# Patient Record
Sex: FEMALE | Race: WHITE | NOT HISPANIC OR LATINO | Employment: OTHER | URBAN - METROPOLITAN AREA
[De-identification: names, ages, dates, MRNs, and addresses within clinical notes are randomized per-mention and may not be internally consistent; named-entity substitution may affect disease eponyms.]

---

## 2017-02-14 ENCOUNTER — TRANSFERRED RECORDS (OUTPATIENT)
Dept: FAMILY MEDICINE | Facility: CLINIC | Age: 67
End: 2017-02-14

## 2017-02-14 LAB
CHOLEST SERPL-MCNC: 164 MG/DL (ref 0–200)
CREAT SERPL-MCNC: 1 MG/DL (ref 0.6–1.1)
GFR SERPL CREATININE-BSD FRML MDRD: >60 ML/MIN/1.73M2
HDLC SERPL-MCNC: 75 MG/DL
LDLC SERPL CALC-MCNC: 76 MG/DL
NONHDLC SERPL-MCNC: 89 MG/DL
TRIGL SERPL-MCNC: 64 MG/DL
TSH SERPL-ACNC: 2.7 (ref 0.3–4.2)

## 2017-05-01 DIAGNOSIS — Z76.0 ENCOUNTER FOR MEDICATION REFILL: ICD-10-CM

## 2017-05-01 RX ORDER — SIMVASTATIN 20 MG
TABLET ORAL
Qty: 90 TABLET | Refills: 0 | Status: SHIPPED | OUTPATIENT
Start: 2017-05-01 | End: 2017-07-29

## 2017-05-04 ENCOUNTER — OFFICE VISIT (OUTPATIENT)
Dept: FAMILY MEDICINE | Facility: CLINIC | Age: 67
End: 2017-05-04

## 2017-05-04 VITALS
OXYGEN SATURATION: 99 % | HEART RATE: 62 BPM | SYSTOLIC BLOOD PRESSURE: 102 MMHG | WEIGHT: 133 LBS | DIASTOLIC BLOOD PRESSURE: 70 MMHG | BODY MASS INDEX: 21.63 KG/M2

## 2017-05-04 DIAGNOSIS — R30.0 DYSURIA: Primary | ICD-10-CM

## 2017-05-04 DIAGNOSIS — N30.00 ACUTE CYSTITIS WITHOUT HEMATURIA: ICD-10-CM

## 2017-05-04 LAB
BACTERIA URINE: ABNORMAL
BILIRUB UR QL STRIP: 0
BLOOD URINE DIP: ABNORMAL
CASTS/LPF: 0
COLOR UR: YELLOW
CRYSTAL URINE: 0
EPITHELIAL CELLS - QUEST: ABNORMAL
GLUCOSE UR STRIP-MCNC: 0 MG/DL
KETONES UR QL STRIP: 0
LEUKOCYTE ESTERASE URINE DIP: ABNORMAL
MUCOUS URINE: 0
NITRITE UR QL STRIP: ABNORMAL
PH UR STRIP: 6 PH (ref 5–9)
PROT UR QL: 0 MG/DL (ref ?–0.01)
RBC URINE: ABNORMAL (ref 0–3)
SP GR UR STRIP: 1 (ref 1–1.02)
UROBILINOGEN UR QL STRIP: 0.2 EU/DL (ref 0.2–1)
WBC URINE: ABNORMAL (ref 0–3)

## 2017-05-04 PROCEDURE — 81003 URINALYSIS AUTO W/O SCOPE: CPT | Performed by: FAMILY MEDICINE

## 2017-05-04 PROCEDURE — 99213 OFFICE O/P EST LOW 20 MIN: CPT | Performed by: FAMILY MEDICINE

## 2017-05-04 RX ORDER — PHENAZOPYRIDINE HYDROCHLORIDE 100 MG/1
100 TABLET, FILM COATED ORAL 3 TIMES DAILY PRN
Qty: 12 TABLET | Refills: 0 | Status: SHIPPED | OUTPATIENT
Start: 2017-05-04 | End: 2017-12-05

## 2017-05-04 RX ORDER — CIPROFLOXACIN 250 MG/1
250 TABLET, FILM COATED ORAL 2 TIMES DAILY
Qty: 14 TABLET | Refills: 0 | Status: SHIPPED | OUTPATIENT
Start: 2017-05-04 | End: 2017-12-05

## 2017-05-04 NOTE — MR AVS SNAPSHOT
"              After Visit Summary   5/4/2017    Madeleine Morales    MRN: [id number]           Patient Information     Date Of Birth          1950        Visit Information        Provider Department      5/4/2017 2:30 PM Estefanía Cantu MD [facility]        Today's Diagnoses     Dysuria    -  1    Acute cystitis without hematuria           Follow-ups after your visit        Your next 10 appointments already scheduled     Jun 07, 2017  9:00 AM CDT   DX HIP/PELVIS/SPINE with SHMADX1   [facility]    [address] [place] [id number] [phone]           Please do not take any of the following 48 hours prior to your exam: vitamins, calcium tablets, antacids.              Future tests that were ordered for you today     Open Future Orders        Priority Expected Expires Ordered    DX Hip/Pelvis/Spine Routine  5/3/2018 5/3/2017            Who to contact     If you have questions or need follow up information about today's clinic visit or your schedule please contact [facility] directly at [phone].  Normal or non-critical lab and imaging results will be communicated to you by Aspectivahart, letter or phone within 4 business days after the clinic has received the results. If you do not hear from us within 7 days, please contact the clinic through BayRut or phone. If you have a critical or abnormal lab result, we will notify you by phone as soon as possible.  Submit refill requests through 8bit or call your pharmacy and they will forward the refill request to us. Please allow 3 business days for your refill to be completed.          Additional Information About Your Visit        MyChart Information     8bit lets you send messages to your doctor, view your test results, renew your prescriptions, schedule appointments and more. To sign up, go to [email or web address] . Click on \"Log in\" on the left side of the " "screen, which will take you to the Welcome page. Then click on \"Sign up Now\" on the right side of the page.     You will be asked to enter the access code listed below, as well as some personal information. Please follow the directions to create your username and password.     Your access code is: [email or web address]  Expires: 2017  3:52 PM     Your access code will  in 90 days. If you need help or a new code, please call your [facility] or [phone].        Care EveryWhere ID     This is your Care EveryWhere ID. This could be used by other organizations to access your [facility] medical records  [id number]        Your Vitals Were     Pulse Pulse Oximetry BMI (Body Mass Index)             62 99% 21.63 kg/m2          Blood Pressure from Last 3 Encounters:   17 102/70   10/27/16 102/58   07/23/15 108/66    Weight from Last 3 Encounters:   17 60.3 kg (133 lb)   10/27/16 59.9 kg (132 lb)   07/23/15 57.8 kg (127 lb 6.4 oz)              We Performed the Following     Urinalysis w/reflex protein, bili ([facility])     Urine Culture  Routine ([facility])          Today's Medication Changes          These changes are accurate as of: 17  3:52 PM.  If you have any questions, ask your nurse or doctor.               Start taking these medicines.        Dose/Directions    ciprofloxacin 250 MG tablet   Commonly known as:  CIPRO   Used for:  Acute cystitis without hematuria   Started by:  Estefanía Cantu MD        Dose:  250 mg   Take 1 tablet (250 mg) by mouth 2 times daily   Quantity:  14 tablet   Refills:  0       phenazopyridine 100 MG tablet   Commonly known as:  PYRIDIUM   Used for:  Acute cystitis without hematuria   Started by:  Estefanía Cantu MD        Dose:  100 mg   Take 1 tablet (100 mg) by mouth 3 times daily as needed for urinary tract discomfort   Quantity:  12 tablet   Refills:  0            Where to get your medicines      These medications were sent to [facility] - " [facility]AN[facility] - [address] AT [address] [place] [address]  [address] [address]    Hours:  24-hours Phone:  [phone]     ciprofloxacin 250 MG tablet    phenazopyridine 100 MG tablet                Primary Care Provider Office Phone # Fax #    Kb Lua [phone]       [facility] [address]  [facility] [address]        Thank you!     Thank you for choosing [facility]  for your care. Our goal is always to provide you with excellent care. Hearing back from our patients is one way we can continue to improve our services. Please take a few minutes to complete the written survey that you may receive in the mail after your visit with us. Thank you!             Your Updated Medication List - Protect others around you: Learn how to safely use, store and throw away your medicines at www.disposemymeds.org.          This list is accurate as of: 5/4/17  3:52 PM.  Always use your most recent med list.                   Brand Name Dispense Instructions for use    CALCIUM 600+D PO      Take by mouth 2 times daily       ciprofloxacin 250 MG tablet    CIPRO    14 tablet    Take 1 tablet (250 mg) by mouth 2 times daily       clobetasol 0.05 % cream    TEMOVATE         clotrimazole-betamethasone cream    LOTRISONE     DEVIN EXT TO THE AFFECTED AND SURROUNDING AREAS OF SKIN BID IN THE MORNING AND IN THE KASSY PRN       estradiol 0.1 MG/GM cream    ESTRACE     Place 2 g vaginally three times a week       FAMVIR 250 MG Tabs tablet   Generic drug:  famciclovir      Take 250 mg by mouth       FIBERCON 625 MG tablet   Generic drug:  calcium polycarbophil      Take 1 tablet by mouth 2 times daily       MULTI FOR HER 50+ PO      Take by mouth daily       phenazopyridine 100 MG tablet    PYRIDIUM    12 tablet    Take 1 tablet (100 mg) by mouth 3 times daily as needed for urinary tract discomfort       PROBIOTIC DAILY PO      Take by mouth daily 7 days each month        SB LOW DOSE ASA EC 81 MG EC tablet   Generic drug:  aspirin      Take 81 mg by mouth daily       simvastatin 20 MG tablet    ZOCOR    90 tablet    TAKE 1 TABLET BY MOUTH AT BEDTIME       valACYclovir 1000 mg tablet    VALTREX

## 2017-05-04 NOTE — LETTER
[facility] [address] [place]  [address]  Phone: [phone]    May 8, 2017      Madeleine Morales  [address] [facility]  [address] [place] [id number]              Dear Madeleine,    Culture grew bacteria that is susceptible to Cipro, please finish the full course of your antibiotic.         Sincerely,     Kb Lua M.D.    Results for orders placed or performed in visit on 05/04/17   Urinalysis w/reflex protein, bili ([facility])   Result Value Ref Range    Color Urine Yellow     pH Urine 6.0 5 - 9 pH    Specific Gravity Urine 1.005 1.005 - 1.025    Protein Urine 0 0.01 mg/dL    Glucose Urine 0     Ketones Urine 0     Leukocyte Esterase Urine 1+ (A)     Blood Urine 1+ (A)     Nitrite Urine Pos (A) NEG    Bilirubin Urine Dip 0     Urobilinogen Urine 0.2 0.2 - 1.0 EU/dL    WBC Urine 3-6 0 - 3    RBC Urine 0-3 0 - 3    Epithelial Cells Few     Crystal Urine 0     Bacteria Urine Mod     Mucous Urine 0     Casts/LPF 0    Urine Culture  Routine ([facility])   Result Value Ref Range    Urine Culture Final report (A)     Result 1 Escherichia coli (A)     Antimicrobial Susceptibility Comment     Narrative    Performed at:  01 - [facility]  [address] [place] [address]  [id number]  : Anjel Farris MD, Phone:  [phone]

## 2017-05-04 NOTE — PROGRESS NOTES
Problem(s) Oriented visit        SUBJECTIVE:                                                    Madeleine Morales is a 67 year old female who presents to clinic today for painful urination, urgency, and bladder spasm sensation that came on about 36 hours ago. She took some AZO which helped. No fever or back pain.       Problem list, Medication list, Allergies, and Medical/Social/Surgical histories reviewed in [facility] and updated as appropriate.   Additional history: as documented    ROS:  5 point ROS completed and negative except noted above, including Gen, CV, Resp, GI, MS      Histories:   Patient Active Problem List   Diagnosis     Renal artery stenosis (H)     Bronchiectasis (H)     Pure hypercholesterolemia     HSV (herpes simplex virus) infection     Health Care Home     Past Surgical History:   Procedure Laterality Date     toe cystectomy  2012    right great toe        Social History   Substance Use Topics     Smoking status: Never Smoker     Smokeless tobacco: Never Used     Alcohol use 0.6 oz/week     1 Standard drinks or equivalent per week     No family history on file.        OBJECTIVE:                                                    /70  Pulse 62  Wt 60.3 kg (133 lb)  SpO2 99%  BMI 21.63 kg/m2  Body mass index is 21.63 kg/(m^2).   GENERAL APPEARANCE: Alert, no acute distress  MS: extremities normal, no peripheral edema  MS: no CVA tenderness  NEURO: Alert, oriented, speech and mentation normal  PSYCH: mentation appears normal, affect and mood normal   Labs Resulted Today:   Results for orders placed or performed in visit on 05/04/17   Urinalysis w/reflex protein, bili ([facility])   Result Value Ref Range    Color Urine Yellow     pH Urine 6.0 5 - 9 pH    Specific Gravity Urine 1.005 1.005 - 1.025    Protein Urine 0 0.01 mg/dL    Glucose Urine 0     Ketones Urine 0     Leukocyte Esterase Urine 1+ (A)     Blood Urine 1+ (A)     Nitrite Urine Pos (A) NEG    Bilirubin Urine Dip 0     Urobilinogen Urine  0.2 0.2 - 1.0 EU/dL    WBC Urine 3-6 0 - 3    RBC Urine 0-3 0 - 3    Epithelial Cells Few     Crystal Urine 0     Bacteria Urine Mod     Mucous Urine 0     Casts/LPF 0      ASSESSMENT/PLAN:                                                        Madeleine was seen today for uti.    Diagnoses and all orders for this visit:    Dysuria  -     Urinalysis w/reflex protein, bili ([facility])  -     Urine Culture  Routine ([facility])    Acute cystitis without hematuria  -     ciprofloxacin (CIPRO) 250 MG tablet; Take 1 tablet (250 mg) by mouth 2 times daily  -     phenazopyridine (PYRIDIUM) 100 MG tablet; Take 1 tablet (100 mg) by mouth 3 times daily as needed for urinary tract discomfort            The following health maintenance items are reviewed in Epic and correct as of today:  Health Maintenance   Topic Date Due     ADVANCE DIRECTIVE PLANNING Q5 YRS (NO INBASKET)  04/08/1966     HEPATITIS C SCREENING  04/08/1966     INFLUENZA VACCINE (SYSTEM ASSIGNED)  09/01/2017     FALL RISK ASSESSMENT  05/04/2018     MAMMO SCREEN Q2 YR (SYSTEM ASSIGNED)  06/27/2018     LIPID SCREEN Q5 YR FEMALE (SYSTEM ASSIGNED)  09/02/2019     TETANUS IMMUNIZATION (SYSTEM ASSIGNED)  01/01/2022     COLONOSCOPY Q10 YR INBASKET MESSAGE  12/29/2024     DEXA SCAN SCREENING (SYSTEM ASSIGNED)  Completed     PNEUMOCOCCAL  Completed       Estefanía Cantu MD  [facility]  Family Practice  [facility]  [phone]    For any issues my office # is [phone]

## 2017-05-07 LAB
ANTIMICROBIAL SUSCEPTIBILITY: ABNORMAL
Lab: ABNORMAL
URINE CULTURE: ABNORMAL

## 2017-05-08 ENCOUNTER — TELEPHONE (OUTPATIENT)
Dept: FAMILY MEDICINE | Facility: CLINIC | Age: 67
End: 2017-05-08

## 2017-05-08 DIAGNOSIS — N39.0 URINARY TRACT INFECTION: Primary | ICD-10-CM

## 2017-05-08 RX ORDER — CEFUROXIME AXETIL 500 MG/1
500 TABLET ORAL 2 TIMES DAILY
Qty: 14 TABLET | Refills: 0 | Status: SHIPPED | OUTPATIENT
Start: 2017-05-08 | End: 2017-05-15

## 2017-05-08 NOTE — TELEPHONE ENCOUNTER
Patient called requesting urine culture results and reporting symptoms are better but not resolved. Still having pressure/discomfort at beginning of void and frequency, although frequency is only mild. Feels like not emptying fully. Denies fever/ill/chill/n&v/flank or abd pain/hematuria.   Has been taking cipro 250 BID since 5/4 visit with Dr. Cantu and pushing H2O.   Urine culture shows >100,000 e.coli and sensitive to all meds on report, including cipro.   Allergies: sulfa=rash; cefdinir=gi upset; omnicef =?   Plan: per Dr. Cantu change to cefuroxime 500mg BID x 7 days if confirm omnicef reaction only GI upset or other relatively minor side effect/intolerance.  Left message on patient's voicemail with plan and to call if hx of rash or more significant reaction/side effect with omnicef.  Maggy Washington RN

## 2017-06-07 ENCOUNTER — HOSPITAL ENCOUNTER (OUTPATIENT)
Dept: BONE DENSITY | Facility: CLINIC | Age: 67
Discharge: HOME | End: 2017-06-07
Attending: INTERNAL MEDICINE | Admitting: INTERNAL MEDICINE
Payer: COMMERCIAL

## 2017-06-07 DIAGNOSIS — M81.0 OSTEOPOROSIS: ICD-10-CM

## 2017-06-07 PROCEDURE — 77080 DXA BONE DENSITY AXIAL: CPT

## 2017-06-17 NOTE — PROGRESS NOTES
Dear Madeleine,   I am writing to report that your included test results are within expected ranges. I do not suggest that we make any changes at this time.    Kb Lua M.D.

## 2017-06-28 ENCOUNTER — HOSPITAL ENCOUNTER (OUTPATIENT)
Dept: MAMMOGRAPHY | Facility: CLINIC | Age: 67
Discharge: HOME | End: 2017-06-28
Attending: OBSTETRICS & GYNECOLOGY | Admitting: OBSTETRICS & GYNECOLOGY
Payer: COMMERCIAL

## 2017-06-28 DIAGNOSIS — Z12.31 VISIT FOR SCREENING MAMMOGRAM: ICD-10-CM

## 2017-06-28 PROCEDURE — 77063 BREAST TOMOSYNTHESIS BI: CPT

## 2017-07-29 DIAGNOSIS — Z76.0 ENCOUNTER FOR MEDICATION REFILL: ICD-10-CM

## 2017-07-30 RX ORDER — SIMVASTATIN 20 MG
TABLET ORAL
Qty: 90 TABLET | Refills: 0 | Status: SHIPPED | OUTPATIENT
Start: 2017-07-30 | End: 2017-12-12

## 2017-09-28 DIAGNOSIS — Z23 NEED FOR PROPHYLACTIC VACCINATION AND INOCULATION AGAINST INFLUENZA: Primary | ICD-10-CM

## 2017-09-28 PROCEDURE — 90662 IIV NO PRSV INCREASED AG IM: CPT | Performed by: FAMILY MEDICINE

## 2017-09-28 PROCEDURE — G0008 ADMIN INFLUENZA VIRUS VAC: HCPCS | Performed by: FAMILY MEDICINE

## 2017-09-28 NOTE — PROGRESS NOTES
Injectable Influenza Immunization Documentation    1.  Is the person to be vaccinated sick today?   No    2. Does the person to be vaccinated have an allergy to a component   of the vaccine?   No    3. Has the person to be vaccinated ever had a serious reaction   to influenza vaccine in the past?   No    4. Has the person to be vaccinated ever had Guillain-Barré syndrome?   No    Form completed by Lopez Beckham

## 2017-12-05 ENCOUNTER — OFFICE VISIT (OUTPATIENT)
Dept: FAMILY MEDICINE | Facility: CLINIC | Age: 67
End: 2017-12-05

## 2017-12-05 VITALS
WEIGHT: 133 LBS | RESPIRATION RATE: 16 BRPM | HEART RATE: 74 BPM | TEMPERATURE: 97.7 F | BODY MASS INDEX: 21.63 KG/M2 | DIASTOLIC BLOOD PRESSURE: 72 MMHG | OXYGEN SATURATION: 96 % | SYSTOLIC BLOOD PRESSURE: 122 MMHG

## 2017-12-05 DIAGNOSIS — R07.89 LEFT-SIDED CHEST WALL PAIN: Primary | ICD-10-CM

## 2017-12-05 DIAGNOSIS — Z11.59 NEED FOR HEPATITIS C SCREENING TEST: ICD-10-CM

## 2017-12-05 PROCEDURE — 99214 OFFICE O/P EST MOD 30 MIN: CPT | Performed by: FAMILY MEDICINE

## 2017-12-05 PROCEDURE — 71020 XR CHEST 2 VW: CPT | Performed by: FAMILY MEDICINE

## 2017-12-05 RX ORDER — METHYLPREDNISOLONE 4 MG
TABLET, DOSE PACK ORAL
Qty: 21 TABLET | Refills: 0 | Status: SHIPPED | OUTPATIENT
Start: 2017-12-05 | End: 2017-12-19

## 2017-12-05 NOTE — MR AVS SNAPSHOT
"              After Visit Summary   12/5/2017    Madeleine Morales    MRN: [id number]           Patient Information     Date Of Birth          1950        Visit Information        Provider Department      12/5/2017 1:15 PM Sherley Gerardo MD [facility]        Today's Diagnoses     Left-sided chest wall pain    -  1    Need for hepatitis C screening test          Care Instructions    CXR was normal and Sent to [facility] for overreading     Prednisone burst for left sided chest wall pain    Hep C screen today          Follow-ups after your visit        Who to contact     If you have questions or need follow up information about today's clinic visit or your schedule please contact [facility] directly at [phone].  Normal or non-critical lab and imaging results will be communicated to you by Pushkarthart, letter or phone within 4 business days after the clinic has received the results. If you do not hear from us within 7 days, please contact the clinic through Pushkarthart or phone. If you have a critical or abnormal lab result, we will notify you by phone as soon as possible.  Submit refill requests through FanTree or call your pharmacy and they will forward the refill request to us. Please allow 3 business days for your refill to be completed.          Additional Information About Your Visit        MyChart Information     FanTree lets you send messages to your doctor, view your test results, renew your prescriptions, schedule appointments and more. To sign up, go to [email or web address] . Click on \"Log in\" on the left side of the screen, which will take you to the Welcome page. Then click on \"Sign up Now\" on the right side of the page.     You will be asked to enter the access code listed below, as well as some personal information. Please follow the directions to create your username and password.     Your access code is: [email or web address]  Expires: 3/5/2018  1:49 PM     Your access code " will  in 90 days. If you need help or a new code, please call your [facility] clinic or [phone].        Care EveryWhere ID     This is your Care EveryWhere ID. This could be used by other organizations to access your [facility] medical records  [id number]        Your Vitals Were     Pulse Temperature Respirations Pulse Oximetry BMI (Body Mass Index)       74 97.7  F (36.5  C) (Oral) 16 96% 21.63 kg/m2        Blood Pressure from Last 3 Encounters:   17 122/72   17 102/70   10/27/16 102/58    Weight from Last 3 Encounters:   17 60.3 kg (133 lb)   17 60.3 kg (133 lb)   10/27/16 59.9 kg (132 lb)              We Performed the Following     HCV Antibody ([facility])     X-ray Chest 2 vws*          Today's Medication Changes          These changes are accurate as of: 17  1:49 PM.  If you have any questions, ask your nurse or doctor.               Start taking these medicines.        Dose/Directions    methylPREDNISolone 4 MG tablet   Commonly known as:  MEDROL DOSEPAK   Used for:  Left-sided chest wall pain   Started by:  Sherley Gerardo MD        Follow package instructions   Quantity:  21 tablet   Refills:  0            Where to get your medicines      These medications were sent to [facility] [address] [place] [address] [address] [place] [id number]    Hours:  24-hours Phone:  [phone]     methylPREDNISolone 4 MG tablet                Primary Care Provider Office Phone # Fax #    bK Lua [phone] [address] [facility] [address]        Equal Access to Services     [facility] : Swetha Foster, lalito benedict, ariella kaalmayasmin umanzor, jonathan ennis. So [facility] [phone].    ATENCIÓN: Si habla español, tiene a gill disposición servicios gratuitos de asistencia lingüística. Llame al [phone].    We comply with applicable federal  civil rights laws and [place] laws. We do not discriminate on the basis of race, color, national origin, age, disability, sex, sexual orientation, or gender identity.            Thank you!     Thank you for choosing [facility]  for your care. Our goal is always to provide you with excellent care. Hearing back from our patients is one way we can continue to improve our services. Please take a few minutes to complete the written survey that you may receive in the mail after your visit with us. Thank you!             Your Updated Medication List - Protect others around you: Learn how to safely use, store and throw away your medicines at www.disposemymeds.org.          This list is accurate as of: 12/5/17  1:49 PM.  Always use your most recent med list.                   Brand Name Dispense Instructions for use Diagnosis    CALCIUM 600+D PO      Take by mouth 2 times daily    Issue of repeat prescriptions       clobetasol 0.05 % cream    TEMOVATE          clotrimazole-betamethasone cream    LOTRISONE     DEVIN EXT TO THE AFFECTED AND SURROUNDING AREAS OF SKIN BID IN THE MORNING AND IN THE KASSY PRN        CRANBERRY (VACC OXYCOCCUS) PO           estradiol 0.1 MG/GM cream    ESTRACE     Place 2 g vaginally three times a week        FAMVIR 250 MG Tabs tablet   Generic drug:  famciclovir      Take 250 mg by mouth        FIBERCON 625 MG tablet   Generic drug:  calcium polycarbophil      Take 1 tablet by mouth 2 times daily        methylPREDNISolone 4 MG tablet    MEDROL DOSEPAK    21 tablet    Follow package instructions    Left-sided chest wall pain       MULTI FOR HER 50+ PO      Take by mouth daily    Issue of repeat prescriptions       PROBIOTIC DAILY PO      Take by mouth daily 7 days each month    Issue of repeat prescriptions       SB LOW DOSE ASA EC 81 MG EC tablet   Generic drug:  aspirin      Take 81 mg by mouth daily    Issue of repeat prescriptions       simvastatin 20 MG tablet    ZOCOR    90 tablet     TAKE 1 TABLET BY MOUTH AT BEDTIME    Encounter for medication refill       valACYclovir 1000 mg tablet    VALTREX

## 2017-12-05 NOTE — PATIENT INSTRUCTIONS
CXR was normal and Sent to [facility] imaging for overreading     Prednisone burst for left sided chest wall pain    Hep C screen today

## 2017-12-05 NOTE — LETTER
[facility] [address] [place]  [address]  Phone: [phone]    December 7, 2017      Madeleine Morales  [address] [address]              Dear Madeleine,    The results from your recent visit showed Hepatitis C was negative.  You may continue your current plan of care.      Sincerely,     Sherley Gerardo M.D.    Results for orders placed or performed in visit on 12/05/17   HCV Antibody ([facility])   Result Value Ref Range    Hep C Virus Ab <0.1 0.0 - 0.9 s/co ratio    Narrative    Performed at:  01 - [facility] [address], [place], [place]  [id number]  : Anjel Farris MD, Phone:  [phone]

## 2017-12-05 NOTE — PROGRESS NOTES
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    Right tricep (week before thanksgiving) bleed  During pickle ball      Musculoskeletal problem/pain      Duration: 3 days, no recent injurys     Description  Location: left upper pectoral and left axillary pain     Intensity: currently  9/10    Accompanying signs and symptoms: describes pain as sharp intermittent pain, does not radiate, no numbness, no tingling (zingers)    History  Previous similar problem: no   Previous evaluation:  none    Precipitating or alleviating factors:  Trauma or overuse: no   Aggravating factors include: NONE     Therapies tried and outcome: Tylenol 500 mg two tablets - somewhat effective, ibuprofen 200 mg two tablets - not effective      Ibuprofen/Tylenol helped some    Did not take anything today    Sleep interrupted sleep yesterday  Appetite ok  Exercise walking    Smoking no  ETOH rare wine  Street drugs/MJ no  Caffeine instant coffee    Travel [place]  Exposures no                  Problem list and histories reviewed & adjusted, as indicated.  Additional history: as documented    Patient Active Problem List   Diagnosis     Renal artery stenosis (H)     Bronchiectasis (H)     Pure hypercholesterolemia     HSV (herpes simplex virus) infection     Health Care Home     Past Surgical History:   Procedure Laterality Date     toe cystectomy  2012    right great toe        Social History   Substance Use Topics     Smoking status: Never Smoker     Smokeless tobacco: Never Used     Alcohol use 0.6 oz/week     1 Standard drinks or equivalent per week     No family history on file.      Current Outpatient Prescriptions   Medication Sig Dispense Refill     simvastatin (ZOCOR) 20 MG tablet TAKE 1 TABLET BY MOUTH AT BEDTIME 90 tablet 0     ciprofloxacin (CIPRO) 250 MG tablet Take 1  "tablet (250 mg) by mouth 2 times daily 14 tablet 0     phenazopyridine (PYRIDIUM) 100 MG tablet Take 1 tablet (100 mg) by mouth 3 times daily as needed for urinary tract discomfort 12 tablet 0     famciclovir (FAMVIR) 250 MG TABS Take 250 mg by mouth       valACYclovir (VALTREX) 1000 mg tablet   2     clobetasol (TEMOVATE) 0.05 % cream   0     clotrimazole-betamethasone (LOTRISONE) cream DEVIN EXT TO THE AFFECTED AND SURROUNDING AREAS OF SKIN BID IN THE MORNING AND IN THE KASSY PRN  2     calcium polycarbophil (FIBERCON) 625 MG tablet Take 1 tablet by mouth 2 times daily       estradiol (ESTRACE) 0.1 MG/GM vaginal cream Place 2 g vaginally three times a week       Multiple Vitamins-Minerals (MULTI FOR HER 50+ PO) Take by mouth daily       Calcium Carbonate-Vitamin D (CALCIUM 600+D PO) Take by mouth 2 times daily        aspirin (SB LOW DOSE ASA EC) 81 MG EC tablet Take 81 mg by mouth daily       Probiotic Product (PROBIOTIC DAILY PO) Take by mouth daily 7 days each month       Allergies   Allergen Reactions     Cefdinir      Other reaction(s): GI Upset     Omnicef      Sulfa Drugs Rash     Recent Labs   Lab Test  02/14/17   0829 09/02/14 07/02/13   LDL  76  95   --   76   HDL  75  74   --   52   TRIG  64  72   --   97   CR  1.0  0.9   < >   --    GFRESTIMATED  >60   --    --    --    GFRESTBLACK  >60   --    --    --    TSH  2.7   --    --    --     < > = values in this interval not displayed.      BP Readings from Last 3 Encounters:   12/08/17 124/76   12/05/17 122/72   05/04/17 102/70    Wt Readings from Last 3 Encounters:   12/08/17 62.1 kg (137 lb)   12/05/17 60.3 kg (133 lb)   05/04/17 60.3 kg (133 lb)       Estimated body mass index is 21.63 kg/(m^2) as calculated from the following:    Height as of 7/23/15: 1.67 m (5' 5.75\").    Weight as of this encounter: 60.3 kg (133 lb).             Labs reviewed in EPIC          Reviewed and updated as needed this visit by clinical staff     Reviewed and updated as needed " this visit by Provider         ROS:  Constitutional, HEENT, cardiovascular, pulmonary, GI, , musculoskeletal, neuro, skin, endocrine and psych systems are negative, except as otherwise noted.      OBJECTIVE:   /72  Pulse 74  Temp 97.7  F (36.5  C) (Oral)  Resp 16  Wt 60.3 kg (133 lb)  SpO2 96%  BMI 21.63 kg/m2  There is no height or weight on file to calculate BMI.  GENERAL: healthy, alert and no distress  EYES: Eyes grossly normal to inspection, PERRL and conjunctivae and sclerae normal  HENT: ear canals and TM's normal, nose and mouth without ulcers or lesions  NECK: no adenopathy, no asymmetry, masses, or scars and thyroid normal to palpation  RESP: lungs clear to auscultation - no rales, rhonchi or wheezes  CV: regular rate and rhythm, normal S1 S2, no S3 or S4, no murmur, click or rub, no peripheral edema and peripheral pulses strong  ABDOMEN: soft, nontender, no hepatosplenomegaly, no masses and bowel sounds normal  MS: no gross musculoskeletal defects noted, no edema  SKIN: no suspicious lesions or rashes  NEURO: Normal strength and tone, mentation intact and speech normal  PSYCH: mentation appears normal, affect normal/bright  LYMPH: no cervical, supraclavicular, axillary, or inguinal adenopathy    Diagnostic Test Results:  Results for orders placed or performed during the hospital encounter of 06/28/17   MA Screen Bilateral w/Nnamdi    Narrative    SCREENING MAMMOGRAM, BILATERAL, DIGITAL w/CAD AND TOMOSYNTHESIS -  6/28/2017 9:23 AM    BREAST SYMPTOMS: No current breast complaints.     COMPARISON:  6/27/2016,  6/23/2015, 6/9/2014, 6/6/2013.    BREAST DENSITY: Scattered fibroglandular densities.    COMMENTS: No findings of suspicion for malignancy.       Impression    IMPRESSION: BI-RADS CATEGORY: 1 -  Negative    RECOMMENDED FOLLOW-UP: Annual Mammography.    Exam results letter mailed to patient.      NELLA PRIEST MD     LDL Cholesterol Calculated   Date Value Ref Range Status   02/14/2017 76  <130 mg/dL Final   ]  Was at [facility]. Stable bronchiectasis.    Living will has one.  Hep C screen today    ASSESSMENT/PLAN:     ASSESSMENT / PLAN:  (R07.89) Left-sided chest wall pain  (primary encounter diagnosis)  Comment:   Plan: X-ray Chest 2 vws*, methylPREDNISolone (MEDROL         DOSEPAK) 4 MG tablet, RUI PT, HAND, AND         CHIROPRACTIC REFERRAL            (Z11.59) Need for hepatitis C screening test  Comment:   Plan: HCV Antibody ([facility])                Patient Instructions   CXR was normal and Sent to [facility] for overreading     Prednisone burst for left sided chest wall pain    Hep C screen today      Sherley Gerardo MD  [facility]

## 2017-12-06 ENCOUNTER — THERAPY VISIT (OUTPATIENT)
Dept: PHYSICAL THERAPY | Facility: CLINIC | Age: 67
End: 2017-12-06
Payer: COMMERCIAL

## 2017-12-06 DIAGNOSIS — M79.621 PAIN OF RIGHT UPPER ARM: Primary | ICD-10-CM

## 2017-12-06 PROCEDURE — G8984 CARRY CURRENT STATUS: HCPCS | Mod: GP | Performed by: PHYSICAL THERAPIST

## 2017-12-06 PROCEDURE — 97110 THERAPEUTIC EXERCISES: CPT | Mod: GP | Performed by: PHYSICAL THERAPIST

## 2017-12-06 PROCEDURE — G8985 CARRY GOAL STATUS: HCPCS | Mod: GP | Performed by: PHYSICAL THERAPIST

## 2017-12-06 PROCEDURE — 97161 PT EVAL LOW COMPLEX 20 MIN: CPT | Mod: GP | Performed by: PHYSICAL THERAPIST

## 2017-12-06 NOTE — LETTER
[facility] PHYSICAL THERAPY  [address]  [place] [address]  [phone]    2017    Re: Madeleine Morales   :   1950  MRN:  [id number]   REFERRING PHYSICIAN:   Sherley Gerardo    [facility] PHYSICAL [facility]  Date of Initial Evaluation:  2017  Visits: 1  Rxs Used: 1  Reason for Referral:  Pain of right upper arm  EVALUATION SUMMARY  Physical Therapy Initial Evaluation  2017   Precautions/Restrictions/MD instructions: PT eval and treat.   Subjective:   Date of Onset: 17  C/C: R posterior arm pain.   Quality of pain is dull and aching. Pains are described as intermittent in nature. Pain is worse: same throughout day. Pain is rated 0/10.   History of symptoms: Pains began suddenly as the result of a backhand stroke during a pickleball match. Noticed pain right away, not a pop. Played remainder of match but couldn't play as well. Over next few days developed large bruise around posterior elbow. Since onset, symptoms are improving.  Worsened by: Reaching to back of head or upper back (strain/pull). Able to push without difficulty.     Alleviated by: Rest, Ice and Icy/Hot cream.    General health as reported by patient: good  Pertinent medical/surgical history: osteopenia. Imaging: none. Current occupational status: Retired. Patient's goals are: decrease pain, return to pickleball (3-4x/wk). Return to MD:  PRN.   Therapist Impression:   Madeleine Morales is a 67 year old RHD female with 1-month history of R elbow pain. She presents with signs and symptoms consistent with tricep tear. These impairments limit her ability to perform self-cares and participate in sport. Skilled PT services are necessary in order to reduce impairments and improve independent function.        Pertinent medical history includes:  Menopausal.  Medical allergies: yes (sulfa).  Other surgeries include:  Other.  Current medications:   Steroids and high blood pressure medication.  Current occupation is Retired . Barriers include:  None as reported by patient.Red flags:  None as reported by patient.  Objective:  Re: Madeleine Morales   :   1950  ELBOW EXAMINATION  ELBOW RANGE OF MOTION & STRENGTH  (* indicates patient's pain)    PROM L PROM R AROM L  AROM R  MMT L  MMT R    Ext    0 0 5 5   Flex    135 135 5 5            No pain with passive elbow flexion, but with added shoulder elevation has strain in R tricep.  SHOULDER RANGE OF MOTION & STRENGTH  (* indicates patient's pain)    AROM L  AROM R  MMT L  MMT R    Flex        Abd    4 4   IR    5 5   ER    5 5   Ext/IR                 SCAPULAR KINEMATIC EVALUATION  Position/Test Findings   Hands resting at sides Increased downward rotation R>L, increased retraction on R. Inferior angle prominence bilaterally R>L   Hands on hips Increased downward rotation R>L, increased retraction on R. Inferior angle prominence bilaterally R>L   90deg scaption No obvious findings   Repeated scaption Medial border prominence bilaterally   Resisted scaption No obvious findings   Flip sign/resisted ER Positive for medial border prominence on R   Assessment/Plan:    The patient is a 67 year old female with chief complaint of R posterior arm pain.    The patient has the following significant findings with corresponding treatment plan.  Diagnosis 1:  Partial tear of R tricep    Pain -  hot/cold therapy, US, electric stimulation, manual therapy, splint/taping/bracing/orthotics and self management  Decreased ROM/flexibility - manual therapy, therapeutic exercise and home program  Decreased strength - therapeutic exercise, therapeutic activities and home program  Impaired muscle performance - neuro re-education  Decreased function - therapeutic activities  Therapy Evaluation Codes:   1) History comprised of:   Personal factors that impact the plan of care:      None.    Comorbidity factors that impact the plan of care  are:      None.     Medications impacting care: None.  2) Examination of Body Systems comprised of:   Body structures and functions that impact the plan of care:      Shoulder.   Activity limitations that impact the plan of care are:      reaching.   Clinical presentation characteristics are:    Stable/Uncomplicated.  3) Presentation comprised of:   Presentation scored as Low complexity with uncomplicated characteristics..  4) Decision-Making    Low complexity using standardized patient assessment instrument and/or measureable assessment of functional outcome.  Cumulative Therapy Evaluation is: Low complexity.  Previous and current functional limitations:  (See Goal Flow Sheet for this information)    Short term and Long term goals: (See Goal Flow Sheet for this information)   Communication ability:  Patient appears to be able to clearly communicate and understand verbal and written communication and follow directions correctly.  Treatment Explanation - The following has been discussed with the patient: RX ordered/plan of care, anticipated outcomes, and possible risks and side effects.  This patient would benefit from PT intervention to resume normal activities.   Rehab potential is excellent.  Frequency:  1 X week, once daily  Duration:  for 4 weeks  Discharge Plan: Achieve all LTGs, be independent in home treatment program, and reach maximal therapeutic benefit.    Thank you for your referral.    INQUIRIES  Therapist: Danny Alcazar DPT, [facility]   [facility]  [address] [place] [address] [address] [place] [id number]  Phone: [phone]  Fax: [phone]

## 2017-12-06 NOTE — PROGRESS NOTES
Physical Therapy Initial Evaluation  December 6, 2017     Precautions/Restrictions/MD instructions: PT eval and treat.     Subjective:   Date of Onset: 11/6/17  C/C: R posterior arm pain.   Quality of pain is dull and aching. Pains are described as intermittent in nature. Pain is worse: same throughout day. Pain is rated 0/10.   History of symptoms: Pains began suddenly as the result of a backhand stroke during a pickleball match. Noticed pain right away, not a pop. Played remainder of match but couldn't play as well. Over next few days developed large bruise around posterior elbow. Since onset, symptoms are improving.  Worsened by: Reaching to back of head or upper back (strain/pull). Able to push without difficulty.     Alleviated by: Rest, Ice and Icy/Hot cream.    General health as reported by patient: good  Pertinent medical/surgical history: osteopenia. Imaging: none. Current occupational status: Retired. Patient's goals are: decrease pain, return to pickleball (3-4x/wk). Return to MD:  PRN.     Therapist Impression:   Madeleine Morales is a 67 year old RHD female with 1-month history of R elbow pain. She presents with signs and symptoms consistent with tricep tear. These impairments limit her ability to perform self-cares and participate in sport. Skilled PT services are necessary in order to reduce impairments and improve independent function.    Objective:  ELBOW EXAMINATION    ELBOW RANGE OF MOTION & STRENGTH  (* indicates patient's pain)    PROM L PROM R AROM L  AROM R  MMT L  MMT R    Ext    0 0 5 5   Flex    135 135 5 5            No pain with passive elbow flexion, but with added shoulder elevation has strain in R tricep.    SHOULDER RANGE OF MOTION & STRENGTH  (* indicates patient's pain)    AROM L  AROM R  MMT L  MMT R    Flex        Abd    4 4   IR    5 5   ER    5 5   Ext/IR                   SCAPULAR KINEMATIC EVALUATION  Position/Test Findings   Hands resting at sides Increased downward rotation  R>L, increased retraction on R. Inferior angle prominence bilaterally R>L   Hands on hips Increased downward rotation R>L, increased retraction on R. Inferior angle prominence bilaterally R>L   90deg scaption No obvious findings   Repeated scaption Medial border prominence bilaterally   Resisted scaption No obvious findings   Flip sign/resisted ER Positive for medial border prominence on R     Assessment/Plan:    The patient is a 67 year old female with chief complaint of R posterior arm pain.    The patient has the following significant findings with corresponding treatment plan.  Diagnosis 1:  Partial tear of R tricep    Pain -  hot/cold therapy, US, electric stimulation, manual therapy, splint/taping/bracing/orthotics and self management  Decreased ROM/flexibility - manual therapy, therapeutic exercise and home program  Decreased strength - therapeutic exercise, therapeutic activities and home program  Impaired muscle performance - neuro re-education  Decreased function - therapeutic activities    Therapy Evaluation Codes:   1) History comprised of:   Personal factors that impact the plan of care:      None.    Comorbidity factors that impact the plan of care are:      None.     Medications impacting care: None.  2) Examination of Body Systems comprised of:   Body structures and functions that impact the plan of care:      Shoulder.   Activity limitations that impact the plan of care are:      reaching.   Clinical presentation characteristics are:    Stable/Uncomplicated.  3) Presentation comprised of:   Presentation scored as Low complexity with uncomplicated characteristics..  4) Decision-Making    Low complexity using standardized patient assessment instrument and/or measureable assessment of functional outcome.  Cumulative Therapy Evaluation is: Low complexity.    Previous and current functional limitations:  (See Goal Flow Sheet for this information)    Short term and Long term goals: (See Goal Flow Sheet  for this information)     Communication ability:  Patient appears to be able to clearly communicate and understand verbal and written communication and follow directions correctly.  Treatment Explanation - The following has been discussed with the patient: RX ordered/plan of care, anticipated outcomes, and possible risks and side effects.  This patient would benefit from PT intervention to resume normal activities.   Rehab potential is excellent.    Frequency:  1 X week, once daily  Duration:  for 4 weeks  Discharge Plan: Achieve all LTGs, be independent in home treatment program, and reach maximal therapeutic benefit.    Please refer to the daily flowsheet for treatment today, total treatment time and time spent performing 1:1 timed codes.

## 2017-12-06 NOTE — PROGRESS NOTES
Subjective:    Patient is a 67 year old female presenting with rehab left ankle/foot hpi.                                      Pertinent medical history includes:  Menopausal.  Medical allergies: yes (sulfa).  Other surgeries include:  Other.  Current medications:  Steroids and high blood pressure medication.  Current occupation is Retired .        Barriers include:  None as reported by patient.    Red flags:  None as reported by patient.                        Objective:    System    Physical Exam    General     ROS    Assessment/Plan:

## 2017-12-07 LAB — HCV AB SERPL QL IA: <0.1 S/CO RATIO (ref 0–0.9)

## 2017-12-08 ENCOUNTER — OFFICE VISIT (OUTPATIENT)
Dept: FAMILY MEDICINE | Facility: CLINIC | Age: 67
End: 2017-12-08

## 2017-12-08 VITALS
WEIGHT: 137 LBS | TEMPERATURE: 97.8 F | OXYGEN SATURATION: 98 % | DIASTOLIC BLOOD PRESSURE: 76 MMHG | BODY MASS INDEX: 22.28 KG/M2 | RESPIRATION RATE: 16 BRPM | HEART RATE: 76 BPM | SYSTOLIC BLOOD PRESSURE: 124 MMHG

## 2017-12-08 DIAGNOSIS — B02.8 HERPES ZOSTER WITH COMPLICATION: Primary | ICD-10-CM

## 2017-12-08 PROCEDURE — 99213 OFFICE O/P EST LOW 20 MIN: CPT | Performed by: FAMILY MEDICINE

## 2017-12-08 RX ORDER — VALACYCLOVIR HYDROCHLORIDE 1 G/1
1000 TABLET, FILM COATED ORAL 3 TIMES DAILY
Qty: 21 TABLET | Refills: 0 | Status: SHIPPED | OUTPATIENT
Start: 2017-12-08 | End: 2019-08-30

## 2017-12-08 NOTE — PROGRESS NOTES
HCM:   Living Will- declined     Cc: Possible shingles   SUBJECTIVE:   Madeleine Morales is a 67 year old female who presents to clinic today for the following health issues:     White female  NO history of shingles, no vaccine  Chicken pox as child  Rash      Duration: 5 days     Description  Location: left lateral chest and right breast and under right breast , skin red spots  Itching: mild    Intensity:  Moderate- pain     Accompanying signs and symptoms: tender to the touch, no fevers, no chills     History (similar episodes/previous evaluation): None    Precipitating or alleviating factors:  New exposures:  None  Recent travel: no      Therapies tried and outcome: none            Problem list and histories reviewed & adjusted, as indicated.  Additional history: as documented    Patient Active Problem List   Diagnosis     Renal artery stenosis (H)     Bronchiectasis (H)     Pure hypercholesterolemia     HSV (herpes simplex virus) infection     Health Care Home     Pain of right upper arm     Past Surgical History:   Procedure Laterality Date     toe cystectomy  2012    right great toe        Social History   Substance Use Topics     Smoking status: Never Smoker     Smokeless tobacco: Never Used     Alcohol use 0.6 oz/week     1 Standard drinks or equivalent per week     Family History   Problem Relation Age of Onset     CEREBROVASCULAR DISEASE Mother      CANCER Sister      ovarian     CANCER Brother      esophageal, kidney failure     CEREBROVASCULAR DISEASE Maternal Grandmother          Current Outpatient Prescriptions   Medication Sig Dispense Refill     CRANBERRY, VACC OXYCOCCUS, PO        methylPREDNISolone (MEDROL DOSEPAK) 4 MG tablet Follow package instructions 21 tablet 0     simvastatin (ZOCOR) 20 MG tablet TAKE 1 TABLET BY MOUTH AT BEDTIME 90 tablet 0     famciclovir (FAMVIR) 250 MG TABS Take 250 mg by mouth       valACYclovir (VALTREX) 1000 mg tablet   2     clobetasol (TEMOVATE) 0.05 % cream   0      clotrimazole-betamethasone (LOTRISONE) cream DEVIN EXT TO THE AFFECTED AND SURROUNDING AREAS OF SKIN BID IN THE MORNING AND IN THE KASSY PRN  2     calcium polycarbophil (FIBERCON) 625 MG tablet Take 1 tablet by mouth 2 times daily       estradiol (ESTRACE) 0.1 MG/GM vaginal cream Place 2 g vaginally three times a week       Multiple Vitamins-Minerals (MULTI FOR HER 50+ PO) Take by mouth daily       Calcium Carbonate-Vitamin D (CALCIUM 600+D PO) Take by mouth 2 times daily        aspirin (SB LOW DOSE ASA EC) 81 MG EC tablet Take 81 mg by mouth daily       Probiotic Product (PROBIOTIC DAILY PO) Take by mouth daily 7 days each month       Allergies   Allergen Reactions     Cefdinir      Other reaction(s): GI Upset     Omnicef      Sulfa Drugs Rash     Recent Labs   Lab Test  02/14/17   0829 09/02/14 07/02/13   LDL  76  95   --   76   HDL  75  74   --   52   TRIG  64  72   --   97   CR  1.0  0.9   < >   --    GFRESTIMATED  >60   --    --    --    GFRESTBLACK  >60   --    --    --    TSH  2.7   --    --    --     < > = values in this interval not displayed.      BP Readings from Last 3 Encounters:   12/08/17 124/76   12/05/17 122/72   05/04/17 102/70    Wt Readings from Last 3 Encounters:   12/08/17 62.1 kg (137 lb)   12/05/17 60.3 kg (133 lb)   05/04/17 60.3 kg (133 lb)                  Labs reviewed in EPIC    Tylenol is helping pain, but it awakens her but not as bad as Tuesday      Reviewed and updated as needed this visit by clinical staff     Reviewed and updated as needed this visit by Provider         ROS:  Constitutional, HEENT, cardiovascular, pulmonary, GI, , musculoskeletal, neuro, skin, endocrine and psych systems are negative, except as otherwise noted.      OBJECTIVE:   /76  Pulse 76  Temp 97.8  F (36.6  C) (Oral)  Resp 16  Wt 62.1 kg (137 lb)  SpO2 98%  BMI 22.28 kg/m2  Body mass index is 22.28 kg/(m^2).  GENERAL: healthy, alert and no distress  EYES: Eyes grossly normal to inspection, PERRL  and conjunctivae and sclerae normal  HENT: ear canals and TM's normal, nose and mouth without ulcers or lesions  NECK: no adenopathy, no asymmetry, masses, or scars and thyroid normal to palpation  RESP: lungs clear to auscultation - no rales, rhonchi or wheezes  BREAST: normal without masses, tenderness or nipple discharge and no palpable axillary masses or adenopathy  CV: regular rate and rhythm, normal S1 S2, no S3 or S4, no murmur, click or rub, no peripheral edema and peripheral pulses strong  ABDOMEN: soft, nontender, no hepatosplenomegaly, no masses and bowel sounds normal  MS: no gross musculoskeletal defects noted, no edema  SKIN: no suspicious lesions POSITIVE rashes RIGHT BREAST PINK RAISED TENDER LESION SIZE OF A DIME, BOTH SIDES OF CHEST WALL PINK BLOTCHY PATCHES ABOUT THE SAME LEVEL AND RIGHT UPPER BACK WO PUNCTATE LOOKING SCABS WITHOUT SUPER INFECTION. No scale no vesicles.   Pain is down about 50% on the predisone, but still getting zingers but lessening  NEURO: Normal strength and tone, mentation intact and speech normal  PSYCH: mentation appears normal, affect normal/bright  LYMPH: no cervical, supraclavicular, axillary, or inguinal adenopathy      Right breast red raising tender No mass No discharge Recent Mammogram negative 6/2017  Smaller lesions under arms  Two scabs on right upper back    Reviewed with Dr Hamm    Diagnostic Test Results:  Results for orders placed or performed in visit on 12/05/17   HCV Antibody ([facility])   Result Value Ref Range    Hep C Virus Ab <0.1 0.0 - 0.9 s/co ratio    Narrative    Performed at:  01 - [facility] [address], [place], [place]  [id number]  : Anjel Farris MD, Phone:  [phone]     Creatinine   Date Value Ref Range Status   02/14/2017 1.0 0.6 - 1.1 mg/dL Final     ASSESSMENT/PLAN:     ASSESSMENT / PLAN:  (B02.8) Herpes zoster with complication  (primary encounter diagnosis)  Comment:   Plan: valACYclovir (VALTREX) 1000 mg tablet                 Patient Instructions   Valtrex for mick Gerardo MD  [facility]

## 2017-12-08 NOTE — MR AVS SNAPSHOT
After Visit Summary   12/8/2017    Madeleine Morales    MRN: [id number]           Patient Information     Date Of Birth          1950        Visit Information        Provider Department      12/8/2017 3:30 PM Sherley Gerardo MD [facility]        Today's Diagnoses     Herpes zoster with complication    -  1      Care Instructions    Valtrex for shingles            Follow-ups after your visit        Your next 10 appointments already scheduled     Dec 19, 2017 11:00 AM CST   Office Visit with Sherley Gerardo MD   [facility]    [address] [place] [address] [phone]           Bring a current list of meds and any records pertaining to this visit. For Physicals, please bring immunization records and any forms needing to be filled out. Please arrive 10 minutes early to complete paperwork.            Dec 20, 2017 10:50 AM CST   RUI Extremity with Danny Alcazar PT   [facility] ([facility]  )    [address] [place] [address] [address]  [facility] [address] [phone]              Who to contact     If you have questions or need follow up information about today's clinic visit or your schedule please contact [facility] directly at [phone].  Normal or non-critical lab and imaging results will be communicated to you by [employer], letter or phone within 4 business days after the clinic has received the results. If you do not hear from us within 7 days, please contact the clinic through [employer] or phone. If you have a critical or abnormal lab result, we will notify you by phone as soon as possible.  Submit refill requests through [employer] or call your pharmacy and they will forward the refill request to us. Please allow 3 business days for your refill to be completed.          Additional Information About Your Visit        [employer] Information     [employer] lets you send messages to  "your doctor, view your test results, renew your prescriptions, schedule appointments and more. To sign up, go to [email or web address] . Click on \"Log in\" on the left side of the screen, which will take you to the Welcome page. Then click on \"Sign up Now\" on the right side of the page.     You will be asked to enter the access code listed below, as well as some personal information. Please follow the directions to create your username and password.     Your access code is: [email or web address]  Expires: 3/5/2018  1:49 PM     Your access code will  in 90 days. If you need help or a new code, please call your [facility] clinic or [phone].        Care EveryWhere ID     This is your Care EveryWhere ID. This could be used by other organizations to access your [facility] medical records  [id number]        Your Vitals Were     Pulse Temperature Respirations Pulse Oximetry BMI (Body Mass Index)       76 97.8  F (36.6  C) (Oral) 16 98% 22.28 kg/m2        Blood Pressure from Last 3 Encounters:   17 124/76   17 122/72   17 102/70    Weight from Last 3 Encounters:   17 62.1 kg (137 lb)   17 60.3 kg (133 lb)   17 60.3 kg (133 lb)              Today, you had the following     No orders found for display         Today's Medication Changes          These changes are accurate as of: 17  4:29 PM.  If you have any questions, ask your nurse or doctor.               These medicines have changed or have updated prescriptions.        Dose/Directions    * valACYclovir 1000 mg tablet   Commonly known as:  VALTREX   This may have changed:  Another medication with the same name was added. Make sure you understand how and when to take each.   Changed by:  Kb Lua MD        Refills:  2       * valACYclovir 1000 mg tablet   Commonly known as:  VALTREX   This may have changed:  You were already taking a medication with the same name, and this prescription was added. Make sure you understand " how and when to take each.   Used for:  Herpes zoster with complication   Changed by:  Sherley Gerardo MD        Dose:  1000 mg   Take 1 tablet (1,000 mg) by mouth 3 times daily   Quantity:  21 tablet   Refills:  0       * Notice:  This list has 2 medication(s) that are the same as other medications prescribed for you. Read the directions carefully, and ask your doctor or other care provider to review them with you.         Where to get your medicines      [facility] [address] [place] [address] [address] [place] [address]    Hours:  24-hours Phone:  [phone]     valACYclovir 1000 mg tablet                Primary Care Provider Office Phone # Fax #    Kb Lua [phone] [address]  [facility] [address]        Equal Access to Services     [facility] AH: Hadii aad ku hadasho Soomaali, waaxda luqadaha, qaybta kaalmada adeegyada, waxay jossein hayshirleyn femi kharanarcisa ellis . So [facility] [phone].    ATENCIÓN: Si habla español, tiene a gill disposición servicios gratuitos de asistencia lingüística. Llame al [phone].    We comply with applicable federal civil rights laws and [place] laws. We do not discriminate on the basis of race, color, national origin, age, disability, sex, sexual orientation, or gender identity.            Thank you!     Thank you for choosing [facility]  for your care. Our goal is always to provide you with excellent care. Hearing back from our patients is one way we can continue to improve our services. Please take a few minutes to complete the written survey that you may receive in the mail after your visit with us. Thank you!             Your Updated Medication List - Protect others around you: Learn how to safely use, store and throw away your medicines at www.disposemymeds.org.          This list is accurate as of: 12/8/17  4:29 PM.  Always  use your most recent med list.                   Brand Name Dispense Instructions for use Diagnosis    CALCIUM 600+D PO      Take by mouth 2 times daily    Issue of repeat prescriptions       clobetasol 0.05 % cream    TEMOVATE          clotrimazole-betamethasone cream    LOTRISONE     DEVIN EXT TO THE AFFECTED AND SURROUNDING AREAS OF SKIN BID IN THE MORNING AND IN THE KASSY PRN        CRANBERRY (VACC OXYCOCCUS) PO           estradiol 0.1 MG/GM cream    ESTRACE     Place 2 g vaginally three times a week        FAMVIR 250 MG Tabs tablet   Generic drug:  famciclovir      Take 250 mg by mouth        FIBERCON 625 MG tablet   Generic drug:  calcium polycarbophil      Take 1 tablet by mouth 2 times daily        methylPREDNISolone 4 MG tablet    MEDROL DOSEPAK    21 tablet    Follow package instructions    Left-sided chest wall pain       MULTI FOR HER 50+ PO      Take by mouth daily    Issue of repeat prescriptions       PROBIOTIC DAILY PO      Take by mouth daily 7 days each month    Issue of repeat prescriptions       SB LOW DOSE ASA EC 81 MG EC tablet   Generic drug:  aspirin      Take 81 mg by mouth daily    Issue of repeat prescriptions       simvastatin 20 MG tablet    ZOCOR    90 tablet    TAKE 1 TABLET BY MOUTH AT BEDTIME    Encounter for medication refill       * valACYclovir 1000 mg tablet    VALTREX          * valACYclovir 1000 mg tablet    VALTREX    21 tablet    Take 1 tablet (1,000 mg) by mouth 3 times daily    Herpes zoster with complication       * Notice:  This list has 2 medication(s) that are the same as other medications prescribed for you. Read the directions carefully, and ask your doctor or other care provider to review them with you.

## 2017-12-10 ENCOUNTER — TELEPHONE (OUTPATIENT)
Dept: FAMILY MEDICINE | Facility: CLINIC | Age: 67
End: 2017-12-10

## 2017-12-10 DIAGNOSIS — B02.29 POST HERPETIC NEURALGIA: Primary | ICD-10-CM

## 2017-12-10 RX ORDER — OXYCODONE HYDROCHLORIDE 5 MG/1
5 TABLET ORAL EVERY 4 HOURS PRN
Qty: 30 TABLET | Refills: 0 | Status: SHIPPED | OUTPATIENT
Start: 2017-12-10 | End: 2019-08-30

## 2017-12-10 NOTE — TELEPHONE ENCOUNTER
Patient was recently seen in the office by myself and second opinion by Dr Hamm for a rash felt to be shingles.  She has tried up to 4000 mg of Tylenol in the last 24 hours for pain. In the daytime she was able to tolerate it, but it was worse over night.She reports that she got not sleep and the pain is 10/10.   Creatinine   Date Value Ref Range Status   02/14/2017 1.0 0.6 - 1.1 mg/dL Final   ]      Since she has maxed out the tylenol dose I called the pharmacy with Oxycodone IR 4mg Q4 hours #30 no refill.  A hard copy will need to be sent from the office on Monday.

## 2017-12-12 DIAGNOSIS — Z76.0 ENCOUNTER FOR MEDICATION REFILL: ICD-10-CM

## 2017-12-12 DIAGNOSIS — E78.00 PURE HYPERCHOLESTEROLEMIA: Primary | ICD-10-CM

## 2017-12-12 RX ORDER — SIMVASTATIN 20 MG
TABLET ORAL
Qty: 90 TABLET | Refills: 0 | Status: SHIPPED | OUTPATIENT
Start: 2017-12-12 | End: 2018-03-23

## 2017-12-12 NOTE — TELEPHONE ENCOUNTER
Last OV 12/5/17  Last Lipid 2/14/17    Cholesterol   Date Value Ref Range Status   02/14/2017 164 0 - 200 mg/dL Final   09/02/2014 183 115 - 199 mg/dL Final     HDL Cholesterol   Date Value Ref Range Status   02/14/2017 75 >50 mg/dL Final   09/02/2014 74 mg/dL Final     LDL Cholesterol Calculated   Date Value Ref Range Status   02/14/2017 76 <130 mg/dL Final   09/02/2014 95 mg/dL Final     Triglycerides   Date Value Ref Range Status   02/14/2017 64 <150 mg/dL Final   09/02/2014 72 mg/dL Final     Cholesterol/HDL Ratio   Date Value Ref Range Status   09/02/2014 n/a  Final

## 2017-12-14 ENCOUNTER — TELEPHONE (OUTPATIENT)
Dept: FAMILY MEDICINE | Facility: CLINIC | Age: 67
End: 2017-12-14

## 2017-12-14 DIAGNOSIS — B02.29 POST HERPETIC NEURALGIA: Primary | ICD-10-CM

## 2017-12-14 RX ORDER — MORPHINE SULFATE 15 MG/1
15 TABLET, FILM COATED, EXTENDED RELEASE ORAL
Qty: 14 TABLET | Refills: 0 | Status: SHIPPED | OUTPATIENT
Start: 2017-12-14 | End: 2018-01-03

## 2017-12-14 NOTE — TELEPHONE ENCOUNTER
Patient called requesting change in pain medication, she states that discomfort is tolerable during the day but she has been unable to sleep and pain is much worse at noc.  Per Dr Gerardo prescription for MS contin given to patient.  Daphnie Juan

## 2017-12-18 NOTE — PROGRESS NOTES
HCM:   Living Will     Last OV on 12/8/2017:  Given Valtrex for shingles    Cc; Recheck Arm  SUBJECTIVE:   Madeleine Morales is a 67 year old female who presents to clinic today for the following health issues:    White female  Glasses    Went on Morphine helping her to get to sleep  Last night took it 8 pm. 1255 am took tylenol 1000 mg. Then slept til 845 am 1000 mg tylenol. (3000 mg per day)  Sunday was a good night  Appetite ok  Rash is gone  Constipation BM today  Pain right now 3/10- 10/10    Has 9 pain pills left          FU Herpes zoster with complication of PHN            Problem list and histories reviewed & adjusted, as indicated.  Additional history: as documented    Patient Active Problem List   Diagnosis     Renal artery stenosis (H)     Bronchiectasis (H)     Pure hypercholesterolemia     HSV (herpes simplex virus) infection     Health Care Home     Pain of right upper arm     Past Surgical History:   Procedure Laterality Date     toe cystectomy  2012    right great toe        Social History   Substance Use Topics     Smoking status: Never Smoker     Smokeless tobacco: Never Used     Alcohol use 0.6 oz/week     1 Standard drinks or equivalent per week     Family History   Problem Relation Age of Onset     CEREBROVASCULAR DISEASE Mother      CANCER Sister      ovarian     CANCER Brother      esophageal, kidney failure     CEREBROVASCULAR DISEASE Maternal Grandmother          Current Outpatient Prescriptions   Medication Sig Dispense Refill     morphine (MS CONTIN) 15 MG 12 hr tablet Take 1 tablet (15 mg) by mouth nightly as needed for moderate to severe pain maximum 1 tablet(s) per day 14 tablet 0     simvastatin (ZOCOR) 20 MG tablet TAKE 1 TABLET BY MOUTH AT BEDTIME 90 tablet 0     valACYclovir (VALTREX) 1000 mg tablet Take 1 tablet (1,000 mg) by mouth 3 times daily 21 tablet 0     CRANBERRY, VACC OXYCOCCUS, PO        famciclovir (FAMVIR) 250 MG TABS Take 250 mg by mouth       valACYclovir (VALTREX) 1000  mg tablet   2     clobetasol (TEMOVATE) 0.05 % cream   0     clotrimazole-betamethasone (LOTRISONE) cream DEVIN EXT TO THE AFFECTED AND SURROUNDING AREAS OF SKIN BID IN THE MORNING AND IN THE KASSY PRN  2     calcium polycarbophil (FIBERCON) 625 MG tablet Take 1 tablet by mouth 2 times daily       Multiple Vitamins-Minerals (MULTI FOR HER 50+ PO) Take by mouth daily       Calcium Carbonate-Vitamin D (CALCIUM 600+D PO) Take by mouth 2 times daily        aspirin (SB LOW DOSE ASA EC) 81 MG EC tablet Take 81 mg by mouth daily       Probiotic Product (PROBIOTIC DAILY PO) Take by mouth daily 7 days each month       oxyCODONE IR (ROXICODONE) 5 MG tablet Take 1 tablet (5 mg) by mouth every 4 hours as needed for severe pain or pain (shingles pain) maximum 6 tablet(s) per day (Patient not taking: Reported on 12/19/2017) 30 tablet 0     estradiol (ESTRACE) 0.1 MG/GM vaginal cream Place 2 g vaginally three times a week       Allergies   Allergen Reactions     Cefdinir      Other reaction(s): GI Upset     Omnicef      Sulfa Drugs Rash     Recent Labs   Lab Test  02/14/17   0829 09/02/14 07/02/13   LDL  76  95   --   76   HDL  75  74   --   52   TRIG  64  72   --   97   CR  1.0  0.9   < >   --    GFRESTIMATED  >60   --    --    --    GFRESTBLACK  >60   --    --    --    TSH  2.7   --    --    --     < > = values in this interval not displayed.      BP Readings from Last 3 Encounters:   12/19/17 118/72   12/08/17 124/76   12/05/17 122/72    Wt Readings from Last 3 Encounters:   12/19/17 61.1 kg (134 lb 12.8 oz)   12/08/17 62.1 kg (137 lb)   12/05/17 60.3 kg (133 lb)                  Labs reviewed in EPIC          Reviewed and updated as needed this visit by clinical staff     Reviewed and updated as needed this visit by Provider         ROS:  Constitutional, HEENT, cardiovascular, pulmonary, GI, , musculoskeletal, neuro, skin, endocrine and psych systems are negative, except as otherwise noted.      OBJECTIVE:   /72  Pulse  71  Temp 97.3  F (36.3  C) (Oral)  Resp 16  Wt 61.1 kg (134 lb 12.8 oz)  SpO2 98%  BMI 21.92 kg/m2  Body mass index is 21.92 kg/(m^2).  GENERAL: healthy, alert and no distress  EYES: Eyes grossly normal to inspection, PERRL and conjunctivae and sclerae normal  HENT: ear canals and TM's normal, nose and mouth without ulcers or lesions  NECK: no adenopathy, no asymmetry, masses, or scars and thyroid normal to palpation  RESP: lungs clear to auscultation - no rales, rhonchi or wheezes  CV: regular rate and rhythm, normal S1 S2, no S3 or S4, no murmur, click or rub, no peripheral edema and peripheral pulses strong  ABDOMEN: soft, nontender, no hepatosplenomegaly, no masses and bowel sounds normal  MS: no gross musculoskeletal defects noted, no edema  SKIN: no suspicious lesions or SHINGLES rashes are fading  NEURO: Normal strength and tone, mentation intact and speech normal  PSYCH: mentation appears normal, affect normal/bright  LYMPH: no cervical, supraclavicular, axillary, or inguinal adenopathy    Diagnostic Test Results:  Results for orders placed or performed in visit on 12/05/17   HCV Antibody ([facility])   Result Value Ref Range    Hep C Virus Ab <0.1 0.0 - 0.9 s/co ratio    Narrative    Performed at:  01 - [facility] [address], [place], [place]  [id number]  : Anjel Farris MD, Phone:  [phone]       ASSESSMENT/PLAN:     ASSESSMENT / PLAN:  (B02.29) Post herpetic neuralgia  Comment:   Plan: MS Contin 15 mg Take 2 at HS to see if this gives you better control. Update Dr EDWARDS on Friday of this week.        Patient Instructions   Morphine increase to 30 mg at night and call Dr EDWARDS early Friday with update    Senokot S for constipation          Sherley Gerardo MD  [facility]

## 2017-12-19 ENCOUNTER — OFFICE VISIT (OUTPATIENT)
Dept: FAMILY MEDICINE | Facility: CLINIC | Age: 67
End: 2017-12-19

## 2017-12-19 VITALS
RESPIRATION RATE: 16 BRPM | HEART RATE: 71 BPM | SYSTOLIC BLOOD PRESSURE: 118 MMHG | BODY MASS INDEX: 21.92 KG/M2 | OXYGEN SATURATION: 98 % | WEIGHT: 134.8 LBS | DIASTOLIC BLOOD PRESSURE: 72 MMHG | TEMPERATURE: 97.3 F

## 2017-12-19 DIAGNOSIS — B02.29 POST HERPETIC NEURALGIA: ICD-10-CM

## 2017-12-19 PROCEDURE — 99214 OFFICE O/P EST MOD 30 MIN: CPT | Performed by: FAMILY MEDICINE

## 2017-12-19 NOTE — MR AVS SNAPSHOT
"              After Visit Summary   12/19/2017    Madeleine Morales    MRN: [id number]           Patient Information     Date Of Birth          1950        Visit Information        Provider Department      12/19/2017 11:00 AM Sherley Gerardo MD [facility]        Today's Diagnoses     Post herpetic neuralgia          Care Instructions    Morphine increase to 30 mg at night and call Dr EDWARDS early Friday with update    Senokot S for constipation              Follow-ups after your visit        Your next 10 appointments already scheduled     Jan 11, 2018 10:50 AM CST   RUI Extremity with Danny Alcazar, PT   [facility] ([facility]  )    [address] [facility] [address]  [facility] [address] [phone]              Who to contact     If you have questions or need follow up information about today's clinic visit or your schedule please contact [facility] directly at [phone].  Normal or non-critical lab and imaging results will be communicated to you by [employer], letter or phone within 4 business days after the clinic has received the results. If you do not hear from us within 7 days, please contact the clinic through [employer] or phone. If you have a critical or abnormal lab result, we will notify you by phone as soon as possible.  Submit refill requests through [employer] or call your pharmacy and they will forward the refill request to us. Please allow 3 business days for your refill to be completed.          Additional Information About Your Visit        [employer] Information     [employer] lets you send messages to your doctor, view your test results, renew your prescriptions, schedule appointments and more. To sign up, go to [email or web address] . Click on \"Log in\" on the left side of the screen, which will take you to the Welcome page. Then click on \"Sign up Now\" on the right side of the page.     You will be asked to enter the access code listed " below, as well as some personal information. Please follow the directions to create your username and password.     Your access code is: [email or web address]  Expires: 3/5/2018  1:49 PM     Your access code will  in 90 days. If you need help or a new code, please call your [facility] or [phone].        Care EveryWhere ID     This is your Care EveryWhere ID. This could be used by other organizations to access your [facility] medical records  [id number]        Your Vitals Were     Pulse Temperature Respirations Pulse Oximetry BMI (Body Mass Index)       71 97.3  F (36.3  C) (Oral) 16 98% 21.92 kg/m2        Blood Pressure from Last 3 Encounters:   17 118/72   17 124/76   17 122/72    Weight from Last 3 Encounters:   17 61.1 kg (134 lb 12.8 oz)   17 62.1 kg (137 lb)   17 60.3 kg (133 lb)              Today, you had the following     No orders found for display       Primary Care Provider Office Phone # Fax #    Kb Lua [phone] [address]  [facility] [address]        Equal Access to Services     YAKELIN GARCIA : Hadii sharad ku hadasho Soomaali, waaxda luqadaha, qaybta kaalmada adeegyada, waxay darrell haypily ellis . So [facility] [phone].    ATENCIÓN: Si habla español, tiene a gill disposición servicios gratuitos de asistencia lingüística. Llame al [phone].    We comply with applicable federal civil rights laws and [place] laws. We do not discriminate on the basis of race, color, national origin, age, disability, sex, sexual orientation, or gender identity.            Thank you!     Thank you for choosing [facility]  for your care. Our goal is always to provide you with excellent care. Hearing back from our patients is one way we can continue to improve our services. Please take a few minutes to complete the written survey that you may receive in the mail after your visit with us. Thank you!              Your Updated Medication List - Protect others around you: Learn how to safely use, store and throw away your medicines at www.disposemymeds.org.          This list is accurate as of: 12/19/17 11:41 AM.  Always use your most recent med list.                   Brand Name Dispense Instructions for use Diagnosis    CALCIUM 600+D PO      Take by mouth 2 times daily    Issue of repeat prescriptions       clobetasol 0.05 % cream    TEMOVATE          clotrimazole-betamethasone cream    LOTRISONE     DEVIN EXT TO THE AFFECTED AND SURROUNDING AREAS OF SKIN BID IN THE MORNING AND IN THE KASSY PRN        CRANBERRY (VACC OXYCOCCUS) PO           estradiol 0.1 MG/GM cream    ESTRACE     Place 2 g vaginally three times a week        FAMVIR 250 MG Tabs tablet   Generic drug:  famciclovir      Take 250 mg by mouth        FIBERCON 625 MG tablet   Generic drug:  calcium polycarbophil      Take 1 tablet by mouth 2 times daily        morphine 15 MG 12 hr tablet    MS CONTIN    14 tablet    Take 1 tablet (15 mg) by mouth nightly as needed for moderate to severe pain maximum 1 tablet(s) per day    Post herpetic neuralgia       MULTI FOR HER 50+ PO      Take by mouth daily    Issue of repeat prescriptions       oxyCODONE IR 5 MG tablet    ROXICODONE    30 tablet    Take 1 tablet (5 mg) by mouth every 4 hours as needed for severe pain or pain (shingles pain) maximum 6 tablet(s) per day    Post herpetic neuralgia       PROBIOTIC DAILY PO      Take by mouth daily 7 days each month    Issue of repeat prescriptions       SB LOW DOSE ASA EC 81 MG EC tablet   Generic drug:  aspirin      Take 81 mg by mouth daily    Issue of repeat prescriptions       simvastatin 20 MG tablet    ZOCOR    90 tablet    TAKE 1 TABLET BY MOUTH AT BEDTIME    Encounter for medication refill, Pure hypercholesterolemia       * valACYclovir 1000 mg tablet    VALTREX          * valACYclovir 1000 mg tablet    VALTREX    21 tablet    Take 1 tablet (1,000 mg) by mouth 3 times  daily    Herpes zoster with complication       * Notice:  This list has 2 medication(s) that are the same as other medications prescribed for you. Read the directions carefully, and ask your doctor or other care provider to review them with you.

## 2017-12-22 ENCOUNTER — TELEPHONE (OUTPATIENT)
Dept: FAMILY MEDICINE | Facility: CLINIC | Age: 67
End: 2017-12-22

## 2017-12-22 DIAGNOSIS — B02.29 NEURALGIA, POST-HERPETIC: Primary | ICD-10-CM

## 2017-12-22 RX ORDER — MORPHINE SULFATE 30 MG/1
30 TABLET, FILM COATED, EXTENDED RELEASE ORAL
Qty: 15 TABLET | Refills: 0 | COMMUNITY
Start: 2017-12-22 | End: 2018-01-04 | Stop reason: DRUGHIGH

## 2017-12-22 NOTE — TELEPHONE ENCOUNTER
Patient called to update Dr Gerardo stating that 30mg MS contin has been working extremely well and she has been able to sleep at night for past 3 nights.  Per Dr Gerardo hand written prescription for MS contin 30mg #15 given to patient.  Advised patient to call clinic with update prior to running out of medication-patient verbalized understanding.  Daphnie Juan

## 2018-01-03 ENCOUNTER — TELEPHONE (OUTPATIENT)
Dept: FAMILY MEDICINE | Facility: CLINIC | Age: 68
End: 2018-01-03

## 2018-01-03 DIAGNOSIS — B02.29 POST HERPETIC NEURALGIA: Primary | ICD-10-CM

## 2018-01-04 RX ORDER — MORPHINE SULFATE 15 MG/1
15-30 TABLET, FILM COATED, EXTENDED RELEASE ORAL
Qty: 60 TABLET | Refills: 0 | Status: SHIPPED | OUTPATIENT
Start: 2018-01-04 | End: 2019-08-30

## 2018-01-04 NOTE — TELEPHONE ENCOUNTER
Patient calls with update on PHN at right breast area. Reports the MSContin 30mg at hs has been working very well and allowing her to sleep through the night without pain. Pain continues to be minimal during most of the day and starts to escalate in early evening.   Has 4 tabs left.   States hopes to be able to discontinue it soon but is not ready to go without it at night yet.   Discussed possibly with future rx, maybe getting 15mg tabs with sig of 1-2 at hs prn so she would have ability to use lowest effective dose at night.   Also possibly trying OTC lidocaine 4% patches prn -- could add this now or in future when night pain is less intense.   Advised would forward this info to Dr. Gerardo for review and nurse will contact her in next day or so with Dr. Gerardo's recommendations.   Maggy Washington RN

## 2018-01-04 NOTE — TELEPHONE ENCOUNTER
Per Dr Gerardo prescription for #60 15mg MSContin 1-2 PO nightly as needed for pain.  Prescription left for patient to  at  clinic.  Daphnie Juan

## 2018-01-11 ENCOUNTER — THERAPY VISIT (OUTPATIENT)
Dept: PHYSICAL THERAPY | Facility: CLINIC | Age: 68
End: 2018-01-11
Payer: COMMERCIAL

## 2018-01-11 ENCOUNTER — TELEPHONE (OUTPATIENT)
Dept: FAMILY MEDICINE | Facility: CLINIC | Age: 68
End: 2018-01-11

## 2018-01-11 DIAGNOSIS — M79.621 PAIN OF RIGHT UPPER ARM: ICD-10-CM

## 2018-01-11 PROCEDURE — 97530 THERAPEUTIC ACTIVITIES: CPT | Mod: GP | Performed by: PHYSICAL THERAPIST

## 2018-01-11 PROCEDURE — 97112 NEUROMUSCULAR REEDUCATION: CPT | Mod: GP | Performed by: PHYSICAL THERAPIST

## 2018-01-11 PROCEDURE — 97110 THERAPEUTIC EXERCISES: CPT | Mod: GP | Performed by: PHYSICAL THERAPIST

## 2018-01-11 PROCEDURE — G8986 CARRY D/C STATUS: HCPCS | Mod: GP | Performed by: PHYSICAL THERAPIST

## 2018-01-11 PROCEDURE — G8985 CARRY GOAL STATUS: HCPCS | Mod: GP | Performed by: PHYSICAL THERAPIST

## 2018-01-11 NOTE — TELEPHONE ENCOUNTER
Patient called with update on post herpetic neuralgia.  Patient reports that she has not needed MsContin at  since 12/30/17.  She states that she has been able to sleep well without meds.  Daphnie Juan

## 2018-01-11 NOTE — PROGRESS NOTES
Progress Note    Progress reporting period is from initial eval to Jan 11, 2018.     Patient seen for Rxs Used: 2 visits.    SUBJECTIVE  Subjective changes noted by patient:  Subjective: Overall reports she is doing very well. Couldn't do exercises for 2 weeks due to shingles pain, but since then has been much better. Elbow motion and strength have improved substantially since then. Feels confident in being able to return to [employer]. .  Current pain level is Current Pain level: 0/10.     Previous pain level was  Initial Pain level: 0/10.   Changes in function:  Yes (See Goal flowsheet attached for changes in current functional level)  Adverse reaction to treatment or activity: None    OBJECTIVE  Changes noted in objective findings: Objective: Painfree resisted triceps in multiple planes. Elbow flexion/shoulder flexion combined motion (tricep length) symmetrical and painfree. RC testing 4/5 for scaption, 4+/5 ER, 5/5 IR. .    ASSESSMENT/PLAN  Diagnosis: Partial tear of R tricep   DIAGP:  The encounter diagnosis was Pain of right upper arm.  Updated problem list and treatment plan:   Diagnosis 1:  Partial tear of R tricep     Decreased strength - therapeutic exercise, therapeutic activities and home program  Impaired muscle performance - neuro re-education and home program    STG/LTGs have been met or progress has been made towards goals:  Yes, please see goal flowsheet for most current information.    Assessment of Progress: Patient is meeting short term goals and is progressing towards long term goals.  Self Management Plans:  Patient is independent in a home treatment program.  I have re-evaluated this patient and find that the nature, scope, duration and intensity of the therapy is appropriate for the medical condition of the patient.  Madeleine continues to require the following intervention to meet STG and LTG's:  PT.    Recommendations:  This patient would benefit from continued therapy.     Frequency:  1 X a  month, once daily  Duration:  for 2 visits      Danny Alcazar, PT, DPT, OCS      Please refer to the daily flowsheet for treatment today, total treatment time and time spent performing 1:1 timed codes.

## 2018-02-01 PROBLEM — M79.621 PAIN OF RIGHT UPPER ARM: Status: RESOLVED | Noted: 2017-12-06 | Resolved: 2018-02-01

## 2018-03-23 DIAGNOSIS — E78.00 PURE HYPERCHOLESTEROLEMIA: ICD-10-CM

## 2018-03-23 DIAGNOSIS — Z76.0 ENCOUNTER FOR MEDICATION REFILL: ICD-10-CM

## 2018-03-23 RX ORDER — SIMVASTATIN 20 MG
TABLET ORAL
Qty: 90 TABLET | Refills: 0 | Status: SHIPPED | OUTPATIENT
Start: 2018-03-23 | End: 2018-08-08

## 2018-09-10 ENCOUNTER — HOSPITAL ENCOUNTER (OUTPATIENT)
Dept: MAMMOGRAPHY | Facility: CLINIC | Age: 68
Discharge: HOME | End: 2018-09-10
Attending: OBSTETRICS & GYNECOLOGY | Admitting: OBSTETRICS & GYNECOLOGY
Payer: COMMERCIAL

## 2018-09-10 DIAGNOSIS — Z12.31 VISIT FOR SCREENING MAMMOGRAM: ICD-10-CM

## 2018-09-10 PROCEDURE — 77063 BREAST TOMOSYNTHESIS BI: CPT

## 2018-10-10 DIAGNOSIS — E78.00 PURE HYPERCHOLESTEROLEMIA: ICD-10-CM

## 2018-10-10 DIAGNOSIS — Z76.0 ENCOUNTER FOR MEDICATION REFILL: ICD-10-CM

## 2018-10-11 RX ORDER — SIMVASTATIN 20 MG
TABLET ORAL
Qty: 90 TABLET | Refills: 0 | Status: SHIPPED | OUTPATIENT
Start: 2018-10-11 | End: 2018-12-21

## 2018-12-21 DIAGNOSIS — E78.00 PURE HYPERCHOLESTEROLEMIA: ICD-10-CM

## 2018-12-21 DIAGNOSIS — Z76.0 ENCOUNTER FOR MEDICATION REFILL: ICD-10-CM

## 2018-12-24 RX ORDER — SIMVASTATIN 20 MG
TABLET ORAL
Qty: 90 TABLET | Refills: 0 | Status: SHIPPED | OUTPATIENT
Start: 2018-12-24 | End: 2019-08-30

## 2019-07-17 ENCOUNTER — TELEPHONE (OUTPATIENT)
Dept: FAMILY MEDICINE | Facility: CLINIC | Age: 69
End: 2019-07-17

## 2019-07-17 DIAGNOSIS — T75.3XXA MOTION SICKNESS: ICD-10-CM

## 2019-07-17 DIAGNOSIS — M85.88 OSTEOPENIA OF LUMBAR SPINE: ICD-10-CM

## 2019-07-17 DIAGNOSIS — M85.852 OSTEOPENIA OF FEMORAL NECK, BILATERAL: Primary | ICD-10-CM

## 2019-07-17 DIAGNOSIS — M85.851 OSTEOPENIA OF FEMORAL NECK, BILATERAL: Primary | ICD-10-CM

## 2019-07-17 RX ORDER — SCOLOPAMINE TRANSDERMAL SYSTEM 1 MG/1
1 PATCH, EXTENDED RELEASE TRANSDERMAL
Qty: 1 PATCH | Refills: 0 | Status: SHIPPED | OUTPATIENT
Start: 2019-07-17 | End: 2019-08-30

## 2019-07-17 NOTE — TELEPHONE ENCOUNTER
Patient calls with two requests:     1. Going on one day boat excursion in [place], [place] next weekend and has history of motion sickness. Requests patch for motion sickness.   Plan: ok per Dr. Lua for Transderm scopolamine patch as patient requests. Explained use and rx sent to pharmacy.     2. Patient due for DEXA (last was 6/2017) and asks for order to be sent to  [facility].   Ok per Dr. Lua and order sent to  [facility].     We also discussed last visit in our clinic was 12/2017. Patient goes to [facility] annually for cpx's (last done 2/2019) but considers Dr. Lua her primary MD. Dr. Lua prescribes simvastatin - last lipid was 2/2019 at [facility] - see in Care[facility]where.  Encouraged patient to make appt for med check with Dr. Lua for a med check in next couple months. Patient readily agrees and will call to schedule.  Maggy Washington RN

## 2019-07-24 ENCOUNTER — HOSPITAL ENCOUNTER (OUTPATIENT)
Dept: BONE DENSITY | Facility: CLINIC | Age: 69
Discharge: HOME | End: 2019-07-24
Attending: FAMILY MEDICINE | Admitting: FAMILY MEDICINE
Payer: COMMERCIAL

## 2019-07-24 DIAGNOSIS — M85.851 OSTEOPENIA OF FEMORAL NECK, BILATERAL: ICD-10-CM

## 2019-07-24 DIAGNOSIS — M85.88 OSTEOPENIA OF LUMBAR SPINE: ICD-10-CM

## 2019-07-24 DIAGNOSIS — M85.852 OSTEOPENIA OF FEMORAL NECK, BILATERAL: ICD-10-CM

## 2019-07-24 PROCEDURE — 77080 DXA BONE DENSITY AXIAL: CPT

## 2019-07-24 NOTE — LETTER
[facility] [address] [place]  [address]  Phone: [phone]    July 29, 2019      Madeleine Morales  [address]  [address] [place] [id number]              Dear Madeleine,    I am writing to report that your included test results are within expected ranges. I do not suggest that we make any changes at this time.        Sincerely,     Kb Lua M.D.    Results for orders placed or performed during the hospital encounter of 07/24/19   DEXA - Hip/Pelvis/Spine (FUTURE/[facility])    Narrative    DXA BONE MINERAL DENSITY SCAN   7/24/2019 3:38 PM    TECHNIQUE: The lumbar spine and both hips were scanned with DXA  technique performed using a [employer] scanner. DXA results are  reported according to T-score. The T-score is the standard deviation  from the peak bone mass in a normal young adult patient. A T-score of  -1.0 to -2.5 correlates with osteopenia. A T-score of less than -2.5  correlates with osteoporosis.    In accordance with the ISCD (International Society of Clinical  Densitometry) the lowest BMD between the total hip and femoral neck  will be used.     All treatment decisions require clinical judgment and consideration of  individual patient factors which may not be captured in the FRAX model  and the risk of fracture may be over- or under-estimated by FRAX.    INDICATION: Postmenopausal.    COMPARISON: 6/7/2017    FINDINGS:   Lumbar spine: The T-score is -1.3 from L1-L2. There has been a 1.4  percent decrease in bone density since the prior examination. This is  not statistically significant.    Hips: The left hip femoral neck T-score is -1.7. The right hip femoral  neck T-score is -1.8. Bone mineral density in the worst hip is 0.788  gm/cm2. There has been a 5.7 percent decrease in bone density since  the prior examination. This is statistically significant.      Impression    IMPRESSION:  1. Moderate osteopenia of both hips with mild osteopenia of the  upper  lumbar spine.  2. The probability of major osteoporotic fracture is 15.8 percent and  probability of hip fracture is 3.0 percent within the next 10 years  according to FRAX risk assessment.    MELANY MALLORY MD

## 2019-08-30 ENCOUNTER — OFFICE VISIT (OUTPATIENT)
Dept: FAMILY MEDICINE | Facility: CLINIC | Age: 69
End: 2019-08-30

## 2019-08-30 VITALS
SYSTOLIC BLOOD PRESSURE: 108 MMHG | DIASTOLIC BLOOD PRESSURE: 72 MMHG | OXYGEN SATURATION: 94 % | WEIGHT: 123 LBS | BODY MASS INDEX: 20 KG/M2 | RESPIRATION RATE: 16 BRPM | HEART RATE: 68 BPM

## 2019-08-30 DIAGNOSIS — E78.00 PURE HYPERCHOLESTEROLEMIA: ICD-10-CM

## 2019-08-30 DIAGNOSIS — J47.9 BRONCHIECTASIS WITHOUT COMPLICATION (H): ICD-10-CM

## 2019-08-30 DIAGNOSIS — L71.9 ROSACEA: Primary | ICD-10-CM

## 2019-08-30 DIAGNOSIS — Z71.89 ACP (ADVANCE CARE PLANNING): ICD-10-CM

## 2019-08-30 DIAGNOSIS — Z76.0 ENCOUNTER FOR MEDICATION REFILL: ICD-10-CM

## 2019-08-30 DIAGNOSIS — I70.1 ATHEROSCLEROSIS OF RENAL ARTERY (H): ICD-10-CM

## 2019-08-30 PROBLEM — B02.29 POST HERPETIC NEURALGIA: Status: RESOLVED | Noted: 2017-12-19 | Resolved: 2019-08-30

## 2019-08-30 PROCEDURE — 99214 OFFICE O/P EST MOD 30 MIN: CPT | Performed by: FAMILY MEDICINE

## 2019-08-30 RX ORDER — CYANOCOBALAMIN (VITAMIN B-12) 2500 MCG
2500 TABLET, SUBLINGUAL SUBLINGUAL DAILY
Qty: 30 TABLET | Refills: 11 | Status: SHIPPED | OUTPATIENT
Start: 2019-08-30

## 2019-08-30 RX ORDER — SIMVASTATIN 20 MG
20 TABLET ORAL AT BEDTIME
Qty: 60 TABLET | Refills: 0 | Status: SHIPPED | OUTPATIENT
Start: 2019-08-30 | End: 2022-11-22

## 2019-08-30 RX ORDER — METRONIDAZOLE 7.5 MG/G
GEL TOPICAL 2 TIMES DAILY
Qty: 45 G | Refills: 3 | COMMUNITY
Start: 2019-08-30 | End: 2022-10-24

## 2019-08-30 NOTE — PROGRESS NOTES
Problem(s) Oriented visit        SUBJECTIVE:                                                    Madeleine Morales is a 69 year old female who presents to clinic today for the following health issues :        1.  Bronchiectasis without complication (H)  Doing well, no recent exacerbations      2. Pure hypercholesterolemia  Has history of hyperlipidemia.  On statin for this, denies any significant side effects of this medication.      Latest labs reviewed:    Recent Labs   Lab Test 02/14/17  0829 09/02/14 07/02/13   CHOL 164 183 147   HDL 75 74 52   LDL 76 95 76   TRIG 64 72 97   CHOLHDLRATIO  --  n/a  --         No results found for: AST       3. Rosacea  Doing well on small topical    4. Atherosclerosis of renal artery (H)  Known issue and the cr. Level is staying in good range as is stable.          Problem list, Medication list, Allergies, and Medical/Social/Surgical histories reviewed in EPIC and updated as appropriate.   Additional history: as documented    ROS:  General and Resp. completed and negative except as noted above    Histories:   Patient Active Problem List   Diagnosis     Renal artery stenosis (H)     Bronchiectasis (H)     Pure hypercholesterolemia     HSV (herpes simplex virus) infection     Health Care Home     Past Surgical History:   Procedure Laterality Date     toe cystectomy  2012    right great toe        Social History     Tobacco Use     Smoking status: Never Smoker     Smokeless tobacco: Never Used   Substance Use Topics     Alcohol use: Yes     Alcohol/week: 0.6 oz     Types: 1 Standard drinks or equivalent per week     Family History   Problem Relation Age of Onset     Cerebrovascular Disease Mother      Cancer Sister         ovarian     Cancer Brother         esophageal, kidney failure     Cerebrovascular Disease Maternal Grandmother            OBJECTIVE:                                                    /72   Pulse 68   Resp 16   Wt 55.8 kg (123 lb)   SpO2 94%   BMI 20.00  kg/m    Body mass index is 20 kg/m .   APPEARANCE: = Relaxed and in no distress  Conj/Eyelids = noninjected and lids and lashes are without inflammation  PERRLA/Irises = Pupils Round Reactive to Light and Irisis without inflammation  Neck = No anterior or posterior adenopathy appreciated.  Thyroid = Not enlarged and no masses felt  Resp effort = Calm regular breathing  Breath Sounds = Good air movement with no rales or rhonchi in any lung fields  Heart Rate, Rhythm, & sounds (no Murm)  = Regular rate and rhythm with no S3, S4, or murmur appreciated.  Carotid Art's = Pulses full and equal and no bruits appreciated  Abdomen = Soft, nontender, no masses, & bowel sounds in all quadrants  Liver/Spleen = Normal span and no splenomegaly noted  Digits and Nails = FROM in all finger joints, no nail dystrophy  Ext (edema) = No pretibial edema noted or elsewhere  Musculsktl =  Strength and ROM of major joints are within normal limits  SKIN = absent significant rashes or lesions   Recent/Remote Memory = Alert and Oriented x 3  Mood/Affect = Cooperative and interested     ASSESSMENT/PLAN:                                                        Madeleine was seen today for recheck medication.    Diagnoses and all orders for this visit:    Rosacea    Encounter for medication refill  -     vitamin B-12 (CYANOCOBALAMIN) 2500 MCG sublingual tablet; Take 1 tablet (2,500 mcg) by mouth daily    Pure hypercholesterolemia  Discussed current lipid results, previous results (if available) current guidelines (NCEP) for treatment and goals for lipids.  Discussed lifestyle modification, dietary changes (low fat, low simple carb) and regular aerobic exercise.  Discussed the link between dysmetabolic syndrome and impaired glucose tolerance seen in certain patterns of lipids.  Briefly discussed medication used for lipid lowering, including the statins are their possible side effects of myalgias, rhabdomyolysis, and liver toxicity.    -     simvastatin  (ZOCOR) 20 MG tablet; Take 1 tablet (20 mg) by mouth At Bedtime Due to be seen and have fasting labs prior to further refill    Atherosclerosis of renal artery (H)  Known issue     Bronchiectasis without complication (H)    ACP (advance care planning)  As per reasonable care for Seniors, wants in the Short term aggressive care.   No desire for long term prolongation of life through artificial means if no hope to bring back to a reasonable status.    Other orders  -     Full Code        Work on weight loss  Regular exercise    The following health maintenance items are reviewed in Epic and correct as of today:  Health Maintenance   Topic Date Due     ADVANCE CARE PLANNING  1950     MEDICARE ANNUAL WELLNESS VISIT  04/08/2013     FALL RISK ASSESSMENT  05/04/2018     INFLUENZA VACCINE (1) 09/01/2019     ZOSTER IMMUNIZATION (2 of 2) 09/16/2019     MAMMO SCREENING  09/10/2020     DTAP/TDAP/TD IMMUNIZATION (3 - Td) 01/01/2022     LIPID  02/14/2022     COLONOSCOPY  12/29/2024     DEXA  Completed     HEPATITIS C SCREENING  Completed     PHQ-2  Completed     PNEUMOCOCCAL IMMUNIZATION 65+ LOW/MEDIUM RISK  Completed     IPV IMMUNIZATION  Aged Out     MENINGITIS IMMUNIZATION  Aged Out       Kb Lua MD  [facility]  Family Practice  [facility]  [phone]    For any issues my office # is [phone]

## 2019-09-20 ENCOUNTER — HOSPITAL ENCOUNTER (OUTPATIENT)
Dept: MAMMOGRAPHY | Facility: CLINIC | Age: 69
Discharge: HOME | End: 2019-09-20
Attending: OBSTETRICS & GYNECOLOGY | Admitting: OBSTETRICS & GYNECOLOGY
Payer: COMMERCIAL

## 2019-09-20 DIAGNOSIS — Z12.31 OTHER SCREENING MAMMOGRAM: ICD-10-CM

## 2019-09-20 PROCEDURE — 77063 BREAST TOMOSYNTHESIS BI: CPT

## 2020-04-24 ENCOUNTER — OFFICE VISIT (OUTPATIENT)
Dept: FAMILY MEDICINE | Facility: CLINIC | Age: 70
End: 2020-04-24

## 2020-04-24 VITALS
BODY MASS INDEX: 19.93 KG/M2 | HEART RATE: 60 BPM | RESPIRATION RATE: 16 BRPM | WEIGHT: 124 LBS | DIASTOLIC BLOOD PRESSURE: 82 MMHG | HEIGHT: 66 IN | TEMPERATURE: 97.3 F | SYSTOLIC BLOOD PRESSURE: 138 MMHG

## 2020-04-24 DIAGNOSIS — R30.0 DYSURIA: Primary | ICD-10-CM

## 2020-04-24 LAB
BACTERIA URINE: ABNORMAL
BILIRUB UR QL STRIP: ABNORMAL
BLOOD URINE DIP: ABNORMAL
CASTS/LPF: ABNORMAL
COLOR UR: YELLOW
CRYSTAL URINE: ABNORMAL
EPITHELIAL CELLS - QUEST: ABNORMAL
GLUCOSE UR STRIP-MCNC: ABNORMAL MG/DL
KETONES UR QL STRIP: ABNORMAL
LEUKOCYTE ESTERASE URINE DIP: ABNORMAL
MUCOUS URINE: ABNORMAL
NITRITE UR QL STRIP: ABNORMAL
PH UR STRIP: 6 PH (ref 5–9)
PROT UR QL: ABNORMAL MG/DL (ref ?–0.01)
RBC URINE: ABNORMAL (ref 0–3)
SP GR UR STRIP: 1.01 (ref 1–1.02)
UROBILINOGEN UR QL STRIP: 0.2 EU/DL (ref 0.2–1)
WBC URINE: ABNORMAL (ref 0–3)

## 2020-04-24 PROCEDURE — 99213 OFFICE O/P EST LOW 20 MIN: CPT | Performed by: NURSE PRACTITIONER

## 2020-04-24 PROCEDURE — 81003 URINALYSIS AUTO W/O SCOPE: CPT | Performed by: NURSE PRACTITIONER

## 2020-04-24 RX ORDER — NITROFURANTOIN 25; 75 MG/1; MG/1
100 CAPSULE ORAL 2 TIMES DAILY
Qty: 10 CAPSULE | Refills: 0 | Status: SHIPPED | OUTPATIENT
Start: 2020-04-24 | End: 2020-05-27

## 2020-04-24 ASSESSMENT — MIFFLIN-ST. JEOR: SCORE: 1085.24

## 2020-04-24 NOTE — PROGRESS NOTES
Problem(s) Oriented visit        SUBJECTIVE:                                                    Madeleine Morales is a 70 year old female who presents to clinic today for the following health issues :  Seen today for dysuria, frequency and mild lower abdominal discomfort for the past week and a half.  She reports this feels very similar to a previous UTI, but the symptoms have been coming and going over the past ten days, they are now persistent. She denies any trauma to the area. No fever, no chills, no flank pain, no incontinence.      Problem list, Medication list, Allergies, and Medical/Social/Surgical histories reviewed in [facility] and updated as appropriate.   Additional history: as documented    ROS:  5 point ROS completed and negative except noted above, including Gen, CV, Resp, GI, MS    Histories:   Patient Active Problem List   Diagnosis     Renal artery stenosis (H)     Bronchiectasis (H)     Pure hypercholesterolemia     HSV (herpes simplex virus) infection     Health Care Home     Past Surgical History:   Procedure Laterality Date     toe cystectomy  2012    right great toe        Social History     Tobacco Use     Smoking status: Never Smoker     Smokeless tobacco: Never Used   Substance Use Topics     Alcohol use: Yes     Alcohol/week: 1.0 standard drinks     Types: 1 Standard drinks or equivalent per week     Family History   Problem Relation Age of Onset     Cerebrovascular Disease Mother      Cancer Sister         ovarian     Cancer Brother         esophageal, kidney failure     Cerebrovascular Disease Maternal Grandmother          Current Outpatient Medications   Medication Sig Dispense Refill     aspirin (SB LOW DOSE ASA EC) 81 MG EC tablet Take 81 mg by mouth daily       Calcium Carbonate-Vitamin D (CALCIUM 600+D PO) Take by mouth 2 times daily        calcium polycarbophil (FIBERCON) 625 MG tablet Take 1 tablet by mouth 2 times daily       clobetasol (TEMOVATE) 0.05 % cream   0      "clotrimazole-betamethasone (LOTRISONE) cream DEVIN EXT TO THE AFFECTED AND SURROUNDING AREAS OF SKIN BID IN THE MORNING AND IN THE KASSY PRN  2     CRANBERRY, VACC OXYCOCCUS, PO        estradiol (ESTRACE) 0.1 MG/GM vaginal cream Place 2 g vaginally three times a week       metroNIDAZOLE (METROGEL) 0.75 % external gel Apply topically 2 times daily 45 g 3     Multiple Vitamins-Minerals (MULTI FOR HER 50+ PO) Take by mouth daily       nitroFURantoin macrocrystal-monohydrate (MACROBID) 100 MG capsule Take 1 capsule (100 mg) by mouth 2 times daily for 5 days 10 capsule 0     Probiotic Product (PROBIOTIC DAILY PO) Take by mouth daily 7 days each month       simvastatin (ZOCOR) 20 MG tablet Take 1 tablet (20 mg) by mouth At Bedtime Due to be seen and have fasting labs prior to further refill 60 tablet 0     vitamin B-12 (CYANOCOBALAMIN) 2500 MCG sublingual tablet Take 1 tablet (2,500 mcg) by mouth daily 30 tablet 11       OBJECTIVE:                                                    /82   Pulse 60   Temp 97.3  F (36.3  C)   Resp 16   Ht 1.67 m (5' 5.75\")   Wt 56.2 kg (124 lb)   BMI 20.17 kg/m    Body mass index is 20.17 kg/m .   APPEARANCE: = Relaxed and in no distress  Resp effort = Calm regular breathing  Breath Sounds = Good air movement with no rales or rhonchi in any lung fields  Heart Rate, Rhythm, & sounds (no Murm)  = Regular rate and rhythm with no S3, S4, or murmur appreciated.  Abdomen = Soft, nontender, no masses, & bowel sounds in all quadrants  Liver/Spleen = Normal span and no splenomegaly noted  Recent/Remote Memory = Alert and Oriented x 3  Mood/Affect = Cooperative and interested     ASSESSMENT/PLAN:                                                        Madeleine was seen today for urinary problem.    Diagnoses and all orders for this visit:    Dysuria  -     Urinalysis w/reflex protein, bili ([facility])  -     nitroFURantoin macrocrystal-monohydrate (MACROBID) 100 MG capsule; Take 1 capsule (100 mg) by " mouth 2 times daily for 5 days  -     Urine Culture  Routine ([facility])  I have prescribed you antibiotics to treat a urinary track infection. Please call or return to clinic if your symptoms get worse or fail to improve. Please call if you develop a fever, chills or flank pain.       Patient needs assistance with ADLs: none identified today  Patient needs assistance with iADLs: none identified today    The following health maintenance items are reviewed in Epic and correct as of today:  Health Maintenance   Topic Date Due     MEDICARE ANNUAL WELLNESS VISIT  04/08/2013     PHQ-2  01/01/2020     FALL RISK ASSESSMENT  08/30/2020     MAMMO SCREENING  09/20/2021     DTAP/TDAP/TD IMMUNIZATION (3 - Td) 01/01/2022     LIPID  02/14/2022     ADVANCE CARE PLANNING  08/30/2024     COLORECTAL CANCER SCREENING  12/29/2024     DEXA  Completed     HEPATITIS C SCREENING  Completed     INFLUENZA VACCINE  Completed     PNEUMOCOCCAL IMMUNIZATION 65+ LOW/MEDIUM RISK  Completed     ZOSTER IMMUNIZATION  Completed     IPV IMMUNIZATION  Aged Out     MENINGITIS IMMUNIZATION  Aged Out         Kirti Haywood NP  [facility]  Family Practice  [facility]  [phone]    For any issues my office # is [phone]

## 2020-04-24 NOTE — PATIENT INSTRUCTIONS
If symptoms have not resolved by the end of your prescription please call back and request an extension of the antibiotics. If the culture returns and we need to change the antibiotic we will contact you.

## 2020-04-26 LAB
Lab: NORMAL
URINE CULTURE: NORMAL

## 2020-05-27 ENCOUNTER — OFFICE VISIT (OUTPATIENT)
Dept: FAMILY MEDICINE | Facility: CLINIC | Age: 70
End: 2020-05-27

## 2020-05-27 VITALS
OXYGEN SATURATION: 98 % | HEART RATE: 60 BPM | SYSTOLIC BLOOD PRESSURE: 155 MMHG | WEIGHT: 126 LBS | DIASTOLIC BLOOD PRESSURE: 72 MMHG | TEMPERATURE: 99 F | RESPIRATION RATE: 16 BRPM | BODY MASS INDEX: 20.49 KG/M2

## 2020-05-27 DIAGNOSIS — I70.1 ATHEROSCLEROSIS OF RENAL ARTERY (H): ICD-10-CM

## 2020-05-27 DIAGNOSIS — R03.0 ELEVATED BLOOD PRESSURE READING WITHOUT DIAGNOSIS OF HYPERTENSION: Primary | ICD-10-CM

## 2020-05-27 PROCEDURE — 99214 OFFICE O/P EST MOD 30 MIN: CPT | Performed by: FAMILY MEDICINE

## 2020-05-27 PROCEDURE — 93000 ELECTROCARDIOGRAM COMPLETE: CPT | Performed by: FAMILY MEDICINE

## 2020-05-27 PROCEDURE — 93050 ART PRESSURE WAVEFORM ANALYS: CPT | Performed by: FAMILY MEDICINE

## 2020-05-27 RX ORDER — VALACYCLOVIR HYDROCHLORIDE 500 MG/1
TABLET, FILM COATED ORAL
COMMUNITY
Start: 2020-05-16 | End: 2023-10-25

## 2020-05-27 NOTE — PROGRESS NOTES
SUBJECTIVE:                                                    Madeleine Morales is a 70 year old female who presents to clinic today for evaluation.  She has a history of bronchiectasis and renal artery stenosis.  With respect to the renal artery stenosis she thinks this was diagnosed at [place] and was unilateral but do not have records of this.  She is always had excellent blood pressures.  Her last visit here it was at the upper limit of normal.  She then had the blood pressure rechecked and noticed it was mildly elevated in the 150s.  She denies any chest pain, shortness of breath, orthopnea, paroxysmal nocturnal dyspnea, lower extremity edema or exertional symptoms.  She states she feels completely well and would not have come in other than the incidentally noted elevated blood pressure.      Problem list, Medication list, Allergies, and Medical/Social/Surgical histories reviewed in [facility] and updated as appropriate.   Additional history: as documented    ROS:    A 10 system review was completed and is as noted in HPI and otherwise negative.      Histories:   Patient Active Problem List   Diagnosis     Renal artery stenosis (H)     Bronchiectasis (H)     Pure hypercholesterolemia     HSV (herpes simplex virus) infection     Health Care Home     Past Surgical History:   Procedure Laterality Date     toe cystectomy  2012    right great toe        Social History     Tobacco Use     Smoking status: Never Smoker     Smokeless tobacco: Never Used   Substance Use Topics     Alcohol use: Yes     Alcohol/week: 1.0 standard drinks     Types: 1 Standard drinks or equivalent per week     Family History   Problem Relation Age of Onset     Cerebrovascular Disease Mother      Cancer Sister         ovarian     Cancer Brother         esophageal, kidney failure     Cerebrovascular Disease Maternal Grandmother            OBJECTIVE:                                                    BP (!) 155/72   Pulse 60   Temp 99  F (37.2  C)    Resp 16   Wt 57.2 kg (126 lb)   SpO2 98%   BMI 20.49 kg/m    Body mass index is 20.49 kg/m .     General: Well appearing, NAD  Oropharynx: Clear  Heart: RRR, no murmur  Chest: Lungs CTAB  Skin: Clear without lesions or rash  Psych: Normal mood and affect         EKG personally reviewed.  Sinus bradycardia.  Normal axis.  Normal intervals.  There is nonspecific ST segment flattening.  No previous to compare to.  ASSESSMENT/PLAN:                                                      I am overall reassured by her lack of symptoms and clinical appearance.  There are nonspecific EKG findings as documented above but without any symptoms and with no exertional symptoms we elected not to pursue further work-up with respect to that at this time.  However, with the increasing blood pressure she will monitor more closely as an outpatient and follow-up in 2 weeks for reassessment to discuss her readings.  In addition, with the reported history of renal artery stenosis and increased blood pressure I feel this needs further interval evaluation and I have ordered a duplex ultrasound of her renal arteries.  Red flags that should prompt emergent reevaluation discussed and understood.  All questions answered.  The patient is in agreement with the plan as outlined above.    Elevated blood pressure reading without diagnosis of hypertension  -     EKG 12-lead complete w/read - Clinics  -     Referral to [facility]    Atherosclerosis of renal artery (H)  -     Referral to [facility]        Loy Harper MD  [facility]

## 2020-05-27 NOTE — PATIENT INSTRUCTIONS
Monitor BP while at rest for at least 5 minutes and sitting down and bring recordings to follow up    Schedule renal ultrasound    Call sooner if any concerning changes

## 2020-06-01 VITALS — BODY MASS INDEX: 20.49 KG/M2 | DIASTOLIC BLOOD PRESSURE: 74 MMHG | SYSTOLIC BLOOD PRESSURE: 148 MMHG | WEIGHT: 126 LBS

## 2020-06-01 NOTE — NURSING NOTE
Patient had high blood pressure at last office visit and suppose to be checking outside of clinic but she does not have a cuff and she is having a hard time finding a pharmacy with blood pressure cuffs.    Jen Manzano, CMA

## 2020-06-05 VITALS — DIASTOLIC BLOOD PRESSURE: 70 MMHG | SYSTOLIC BLOOD PRESSURE: 132 MMHG

## 2020-06-05 NOTE — NURSING NOTE
BP Check - 132/70. Patient has appointment with PCP next week 6/10/2020.  Jen Manzano, MARIANA  June 5, 2020

## 2020-06-10 ENCOUNTER — OFFICE VISIT (OUTPATIENT)
Dept: FAMILY MEDICINE | Facility: CLINIC | Age: 70
End: 2020-06-10

## 2020-06-10 VITALS
DIASTOLIC BLOOD PRESSURE: 72 MMHG | TEMPERATURE: 98.3 F | SYSTOLIC BLOOD PRESSURE: 130 MMHG | HEART RATE: 64 BPM | BODY MASS INDEX: 20.65 KG/M2 | WEIGHT: 127 LBS | RESPIRATION RATE: 16 BRPM

## 2020-06-10 DIAGNOSIS — I70.1 RENAL ARTERY STENOSIS (H): Primary | ICD-10-CM

## 2020-06-10 DIAGNOSIS — J47.9 BRONCHIECTASIS WITHOUT COMPLICATION (H): ICD-10-CM

## 2020-06-10 DIAGNOSIS — Z13.6 SCREENING FOR HYPERTENSION: ICD-10-CM

## 2020-06-10 PROCEDURE — 99213 OFFICE O/P EST LOW 20 MIN: CPT | Performed by: FAMILY MEDICINE

## 2020-06-10 PROCEDURE — 93050 ART PRESSURE WAVEFORM ANALYS: CPT | Performed by: FAMILY MEDICINE

## 2020-06-10 NOTE — LETTER
June 12, 2020      Madeleine Morales  [address]  [address]  [place] [address]        Dear Ms Morales,     Your kidney function remains normal, which is great.       Please let me know if you have any questions.     Resulted Orders   Basic Metabolic Panel (8) ([facility])   Result Value Ref Range    Glucose 87 65 - 99 mg/dL    Urea Nitrogen 12 8 - 27 mg/dL    Creatinine 0.84 0.57 - 1.00 mg/dL    eGFR If NonAfricn Am 70 >59 mL/min/1.73    eGFR If Africn Am 80 >59 mL/min/1.73    BUN/Creatinine Ratio 14 12 - 28    Sodium 143 134 - 144 mmol/L    Potassium 4.5 3.5 - 5.2 mmol/L    Chloride 105 96 - 106 mmol/L    Total CO2 26 20 - 29 mmol/L    Calcium 9.2 8.7 - 10.3 mg/dL    Narrative    Performed at:  01 - [facility] [address], [place], [place]  [id number]  : Iraj Garcia MD, Phone:  [phone]       If you have any questions or concerns, please call the clinic at the number listed above.       Sincerely,        Loy Harper MD

## 2020-06-10 NOTE — PROGRESS NOTES
SUBJECTIVE:                                                    Madeleine Morales is a 70 year old female who presents to clinic today for follow up.  She has been monitoring BP.  A couple readings in 140s but mostly 130s.  She feels well.  No HA, chest pain, dyspnea, or swelling.  She has history of mild right KHRIS for which she underwent MRI and ultrasound at [facility].  Was advised to monitor BP and creatinine.  Note reviewed from 2014.  At that time felt surveillance imaging unlikely to be beneficial.  No other concerns.      Problem list, Medication list, Allergies, and Medical/Social/Surgical histories reviewed in EPIC and updated as appropriate.   Additional history: as documented    ROS:    A 5 system review was completed and is as noted in HPI and otherwise negative.      Histories:   Patient Active Problem List   Diagnosis     Renal artery stenosis (H)     Bronchiectasis (H)     Pure hypercholesterolemia     HSV (herpes simplex virus) infection     Health Care Home     Past Surgical History:   Procedure Laterality Date     toe cystectomy  2012    right great toe        Social History     Tobacco Use     Smoking status: Never Smoker     Smokeless tobacco: Never Used   Substance Use Topics     Alcohol use: Yes     Alcohol/week: 1.0 standard drinks     Types: 1 Standard drinks or equivalent per week     Family History   Problem Relation Age of Onset     Cerebrovascular Disease Mother      Cancer Sister         ovarian     Cancer Brother         esophageal, kidney failure     Cerebrovascular Disease Maternal Grandmother            OBJECTIVE:                                                    /72   Pulse 64   Temp 98.3  F (36.8  C) (Skin)   Resp 16   Wt 57.6 kg (127 lb)   BMI 20.65 kg/m    Body mass index is 20.65 kg/m .     General: Well appearing, NAD  Heart: RRR, no murmur  Skin: Clear without lesions or rash  Psych: Normal mood and affect         ASSESSMENT/PLAN:                                                         Renal artery stenosis (H): BP mostly have been normal.  Check BMP today.  Advise follow up with nephrology in the next 6 months or so just to ensure no additional follow up imaging is indicated since BP has gradually increased.    -     Basic Metabolic Panel (8) ([facility])    Screening for hypertension  -     C ART PRESS WAVEFORM ANALYS CENTRAL ART PRESSURE    Bronchiectasis without complication (H): stable, asymptomatic        Loy Harper MD  [facility]

## 2020-06-11 LAB
BUN SERPL-MCNC: 12 MG/DL (ref 8–27)
BUN/CREATININE RATIO: 14 (ref 12–28)
CALCIUM SERPL-MCNC: 9.2 MG/DL (ref 8.7–10.3)
CHLORIDE SERPLBLD-SCNC: 105 MMOL/L (ref 96–106)
CREAT SERPL-MCNC: 0.84 MG/DL (ref 0.57–1)
EGFR IF AFRICN AM: 80 ML/MIN/1.73
EGFR IF NONAFRICN AM: 70 ML/MIN/1.73
GLUCOSE SERPL-MCNC: 87 MG/DL (ref 65–99)
POTASSIUM SERPL-SCNC: 4.5 MMOL/L (ref 3.5–5.2)
SODIUM SERPL-SCNC: 143 MMOL/L (ref 134–144)
TOTAL CO2: 26 MMOL/L (ref 20–29)

## 2020-07-13 ENCOUNTER — HOSPITAL ENCOUNTER (OUTPATIENT)
Dept: ULTRASOUND IMAGING | Facility: CLINIC | Age: 70
Discharge: HOME | End: 2020-07-13
Attending: FAMILY MEDICINE | Admitting: FAMILY MEDICINE
Payer: COMMERCIAL

## 2020-07-13 DIAGNOSIS — R03.0 ELEVATED BLOOD PRESSURE READING WITHOUT DIAGNOSIS OF HYPERTENSION: ICD-10-CM

## 2020-07-13 DIAGNOSIS — I70.1 ATHEROSCLEROSIS OF RENAL ARTERY (H): ICD-10-CM

## 2020-07-13 PROCEDURE — 76770 US EXAM ABDO BACK WALL COMP: CPT

## 2020-07-27 ENCOUNTER — TELEPHONE (OUTPATIENT)
Dept: FAMILY MEDICINE | Facility: CLINIC | Age: 70
End: 2020-07-27

## 2020-07-27 NOTE — TELEPHONE ENCOUNTER
The refill request was sent back to the pharmacy.  Patient will need to make an appointment.      Sergey Acosta,   [facility]  [phone]

## 2020-07-27 NOTE — TELEPHONE ENCOUNTER
Not on med list / Please call pt and verify they are taking ~ might need appt    Nitrofurantion  LOV 6/10/20 (last urinary-related 4/24/20)

## 2020-09-30 ENCOUNTER — OFFICE VISIT (OUTPATIENT)
Dept: FAMILY MEDICINE | Facility: CLINIC | Age: 70
End: 2020-09-30

## 2020-09-30 VITALS
HEART RATE: 62 BPM | WEIGHT: 128.8 LBS | DIASTOLIC BLOOD PRESSURE: 70 MMHG | BODY MASS INDEX: 20.95 KG/M2 | TEMPERATURE: 98.7 F | OXYGEN SATURATION: 99 % | SYSTOLIC BLOOD PRESSURE: 116 MMHG | RESPIRATION RATE: 16 BRPM

## 2020-09-30 DIAGNOSIS — R30.0 DYSURIA: ICD-10-CM

## 2020-09-30 DIAGNOSIS — N30.01 ACUTE CYSTITIS WITH HEMATURIA: Primary | ICD-10-CM

## 2020-09-30 LAB
BACTERIA URINE: ABNORMAL
BILIRUB UR QL STRIP: 0
BLOOD URINE DIP: ABNORMAL
CASTS/LPF: 0
COLOR UR: YELLOW
CRYSTAL URINE: 0
EPITHELIAL CELLS - QUEST: ABNORMAL
GLUCOSE UR STRIP-MCNC: 0 MG/DL
KETONES UR QL STRIP: 0
LEUKOCYTE ESTERASE URINE DIP: ABNORMAL
MUCOUS URINE: 0
NITRITE UR QL STRIP: ABNORMAL
PH UR STRIP: 7 PH (ref 5–9)
PROT UR QL: ABNORMAL MG/DL (ref ?–0.01)
RBC URINE: ABNORMAL (ref 0–3)
SP GR UR STRIP: 1.01 (ref 1–1.02)
UROBILINOGEN UR QL STRIP: 0.2 EU/DL (ref 0.2–1)
WBC URINE: ABNORMAL (ref 0–3)

## 2020-09-30 PROCEDURE — 81003 URINALYSIS AUTO W/O SCOPE: CPT | Performed by: NURSE PRACTITIONER

## 2020-09-30 PROCEDURE — 99213 OFFICE O/P EST LOW 20 MIN: CPT | Performed by: NURSE PRACTITIONER

## 2020-09-30 RX ORDER — PHENAZOPYRIDINE HYDROCHLORIDE 200 MG/1
200 TABLET, FILM COATED ORAL 3 TIMES DAILY PRN
Qty: 6 TABLET | Refills: 0 | Status: SHIPPED | OUTPATIENT
Start: 2020-09-30 | End: 2020-10-02

## 2020-09-30 RX ORDER — NITROFURANTOIN 25; 75 MG/1; MG/1
100 CAPSULE ORAL 2 TIMES DAILY
Qty: 10 CAPSULE | Refills: 0 | Status: SHIPPED | OUTPATIENT
Start: 2020-09-30 | End: 2020-10-05

## 2020-09-30 NOTE — PROGRESS NOTES
Problem(s) Oriented visit        SUBJECTIVE:                                                    Madeleine Morales is a 70 year old female who presents to clinic today for the following health issues :    URINARY TRACT SYMPTOMS  Onset: 2 cays    Description:   Painful urination (Dysuria): YES           Frequency: YES  Blood in urine (Hematuria): YES  Delay in urine (Hesitency): no     Intensity: moderate    Progression of Symptoms:  same    Accompanying Signs & Symptoms:  Fever/chills: no   Flank pain no   Nausea and vomiting: no   Any vaginal symptoms: none  Abdominal/Pelvic Pain: no     History:   History of frequent UTI's: no - last one in April  History of kidney stones: no   Sexually Active: no   Possibility of pregnancy: No    Precipitating factors:   None known    Therapies Tried and outcome: Increase fluid intake helped some    Problem list, Medication list, Allergies, and Medical/Social/Surgical histories reviewed in [facility] and updated as appropriate.   Additional history: as documented    ROS:  5 point ROS completed and negative except noted above, including Gen, CV, Resp, GI,     Histories:   Patient Active Problem List   Diagnosis     Renal artery stenosis (H)     Bronchiectasis (H)     Pure hypercholesterolemia     HSV (herpes simplex virus) infection     Health Care Home     Past Surgical History:   Procedure Laterality Date     toe cystectomy  2012    right great toe        Social History     Tobacco Use     Smoking status: Never Smoker     Smokeless tobacco: Never Used   Substance Use Topics     Alcohol use: Yes     Alcohol/week: 1.0 standard drinks     Types: 1 Standard drinks or equivalent per week     Family History   Problem Relation Age of Onset     Cerebrovascular Disease Mother      Cancer Sister         ovarian     Cancer Brother         esophageal, kidney failure     Cerebrovascular Disease Maternal Grandmother            OBJECTIVE:                                                    /70   " Pulse 62   Temp 98.7  F (37.1  C) (Skin)   Resp 16   Wt 58.4 kg (128 lb 12.8 oz)   SpO2 99%   BMI 20.95 kg/m    Body mass index is 20.95 kg/m .   GENERAL: healthy, alert and no distress  RESP: normal work of breathing  CV: regular rate  MS: no gross musculoskeletal defects noted  PSYCH: mentation appears normal, affect normal/bright    UA RESULTS:  Recent Labs   Lab Test 09/30/20   COLOR Yellow   SG 1.010   URINEPH 7.0   UROBILINOGEN 0.2   NITRITE Neg   RBCU 10-15   WBCU 20-30          ASSESSMENT/PLAN:                                                      BMI:   Estimated body mass index is 20.95 kg/m  as calculated from the following:    Height as of 4/24/20: 1.67 m (5' 5.75\").    Weight as of this encounter: 58.4 kg (128 lb 12.8 oz).         Madeleine was seen today for urinary problem.    Diagnoses and all orders for this visit:    Acute cystitis with hematuria  -     nitroFURantoin macrocrystal-monohydrate (MACROBID) 100 MG capsule; Take 1 capsule (100 mg) by mouth 2 times daily for 5 days  -     Urine Culture  Routine ([facility])  -     phenazopyridine (PYRIDIUM) 200 MG tablet; Take 1 tablet (200 mg) by mouth 3 times daily as needed for irritation    Dysuria  -     Urinalysis w/reflex protein, bili ([facility])  -     phenazopyridine (PYRIDIUM) 200 MG tablet; Take 1 tablet (200 mg) by mouth 3 times daily as needed for irritation        See Patient Instructions  Patient Instructions   Macrobid 1 pill 2 times daily for 5 days.    Follow up if not starting to improve in 24 hours or if still having symptoms after 5 days    Increase water, empty bladder frequently to flush out bacteria    Thank you for coming in today!     We are working to improve our digital reputation.  Would you please help us by reviewing our clinic on Google and/or [employer]?  These are links for filling out a review for the clinic:    [email or web address]                 [email or web address]    We " truly appreciate you taking the time to do this.     General Information:    Today you had your appointment with Jamila Hawk CNP  My hours are:    Monday 8 AM - 5 PM  Wednesday: 8 AM - 5 PM  Thursday: 8 AM - 5 PM  Fridays: 8 AM - 5 PM    I am not in the office Tuesdays. Therefore non urgent calls received on Tuesday will be addressed when I am back in the office on Wednesday.     If lab work was done today as part of your evaluation you will generally be contacted via My Chart, mail, or phone with the results within 1-5 days. If there is an alarming result we will contact you by phone. Lab results come back at varying times, I generally wait until all labs are resulted before making comments on results. Please note labs are automatically released to My Chart once available.     If you need refills please contact your pharmacy They will send a refill request to me to review. Please allow 3 business days for us to process all refill requests.     Please call or send a medical message with any questions or concerns        The following health maintenance items are reviewed in Epic and correct as of today:  Health Maintenance   Topic Date Due     MEDICARE ANNUAL WELLNESS VISIT  04/08/2013     FALL RISK ASSESSMENT  08/30/2020     INFLUENZA VACCINE (1) 09/01/2020     MAMMO SCREENING  09/20/2021     DTAP/TDAP/TD IMMUNIZATION (3 - Td) 01/01/2022     LIPID  02/14/2022     ADVANCE CARE PLANNING  08/30/2024     COLORECTAL CANCER SCREENING  12/29/2024     DEXA  Completed     HEPATITIS C SCREENING  Completed     PHQ-2  Completed     PNEUMOCOCCAL IMMUNIZATION 65+ LOW/MEDIUM RISK  Completed     ZOSTER IMMUNIZATION  Completed     IPV IMMUNIZATION  Aged Out     MENINGITIS IMMUNIZATION  Aged Out     HEPATITIS B IMMUNIZATION  Aged Out       DARRIAN Prieto CNP  [facility]  Family Practice  [facility]  [phone]    For any issues my office # is [phone]

## 2020-09-30 NOTE — PATIENT INSTRUCTIONS
Macrobid 1 pill 2 times daily for 5 days.    Follow up if not starting to improve in 24 hours or if still having symptoms after 5 days    Increase water, empty bladder frequently to flush out bacteria    Thank you for coming in today!     We are working to improve our digital reputation.  Would you please help us by reviewing our clinic on Google and/or Facebook?  These are links for filling out a review for the clinic:    [email or web address]                 [email or web address]    We truly appreciate you taking the time to do this.     General Information:    Today you had your appointment with Jamila Hawk CNP  My hours are:    Monday 8 AM - 5 PM  Wednesday: 8 AM - 5 PM  Thursday: 8 AM - 5 PM  Fridays: 8 AM - 5 PM    I am not in the office Tuesdays. Therefore non urgent calls received on Tuesday will be addressed when I am back in the office on Wednesday.     If lab work was done today as part of your evaluation you will generally be contacted via My Chart, mail, or phone with the results within 1-5 days. If there is an alarming result we will contact you by phone. Lab results come back at varying times, I generally wait until all labs are resulted before making comments on results. Please note labs are automatically released to My Chart once available.     If you need refills please contact your pharmacy They will send a refill request to me to review. Please allow 3 business days for us to process all refill requests.     Please call or send a medical message with any questions or concerns

## 2020-10-02 LAB
Lab: ABNORMAL
URINE CULTURE: ABNORMAL

## 2020-10-19 ENCOUNTER — HOSPITAL ENCOUNTER (OUTPATIENT)
Dept: MAMMOGRAPHY | Facility: CLINIC | Age: 70
Discharge: HOME | End: 2020-10-19
Attending: OBSTETRICS & GYNECOLOGY | Admitting: OBSTETRICS & GYNECOLOGY
Payer: COMMERCIAL

## 2020-10-19 DIAGNOSIS — Z12.31 SCREENING MAMMOGRAM, ENCOUNTER FOR: ICD-10-CM

## 2020-10-19 PROCEDURE — 77067 SCR MAMMO BI INCL CAD: CPT

## 2020-12-20 ENCOUNTER — HEALTH MAINTENANCE LETTER (OUTPATIENT)
Age: 70
End: 2020-12-20

## 2021-01-30 DIAGNOSIS — Z23 HIGH PRIORITY FOR COVID-19 VIRUS VACCINATION: Primary | ICD-10-CM

## 2021-01-30 PROCEDURE — 91301 PR COVID VAC MODERNA 100 MCG/0.5 ML IM: CPT | Performed by: NURSE PRACTITIONER

## 2021-01-30 PROCEDURE — 0011A PR COVID VAC MODERNA 100 MCG/0.5 ML IM: CPT | Performed by: NURSE PRACTITIONER

## 2021-02-27 DIAGNOSIS — Z23 HIGH PRIORITY FOR COVID-19 VIRUS VACCINATION: Primary | ICD-10-CM

## 2021-02-27 PROCEDURE — 91301 PR COVID VAC MODERNA 100 MCG/0.5 ML IM: CPT | Performed by: FAMILY MEDICINE

## 2021-02-27 PROCEDURE — 0012A PR COVID VAC MODERNA 100 MCG/0.5 ML IM: CPT | Performed by: FAMILY MEDICINE

## 2021-05-25 ENCOUNTER — RECORDS - HEALTHEAST (OUTPATIENT)
Dept: ADMINISTRATIVE | Facility: CLINIC | Age: 71
End: 2021-05-25

## 2021-05-28 ENCOUNTER — RECORDS - HEALTHEAST (OUTPATIENT)
Dept: ADMINISTRATIVE | Facility: CLINIC | Age: 71
End: 2021-05-28

## 2021-07-13 ENCOUNTER — RECORDS - HEALTHEAST (OUTPATIENT)
Dept: ADMINISTRATIVE | Facility: CLINIC | Age: 71
End: 2021-07-13

## 2021-07-21 ENCOUNTER — RECORDS - HEALTHEAST (OUTPATIENT)
Dept: ADMINISTRATIVE | Facility: CLINIC | Age: 71
End: 2021-07-21

## 2021-10-03 ENCOUNTER — HEALTH MAINTENANCE LETTER (OUTPATIENT)
Age: 71
End: 2021-10-03

## 2021-10-19 DIAGNOSIS — Z23 NEEDS FLU SHOT: Primary | ICD-10-CM

## 2021-10-19 PROCEDURE — 90662 IIV NO PRSV INCREASED AG IM: CPT | Performed by: FAMILY MEDICINE

## 2021-10-19 PROCEDURE — G0008 ADMIN INFLUENZA VIRUS VAC: HCPCS | Performed by: FAMILY MEDICINE

## 2021-10-20 ENCOUNTER — HOSPITAL ENCOUNTER (OUTPATIENT)
Dept: MAMMOGRAPHY | Facility: CLINIC | Age: 71
Discharge: HOME | End: 2021-10-20
Attending: OBSTETRICS & GYNECOLOGY | Admitting: OBSTETRICS & GYNECOLOGY
Payer: COMMERCIAL

## 2021-10-20 DIAGNOSIS — Z12.31 VISIT FOR SCREENING MAMMOGRAM: ICD-10-CM

## 2021-10-20 PROCEDURE — 77063 BREAST TOMOSYNTHESIS BI: CPT

## 2022-09-10 ENCOUNTER — HEALTH MAINTENANCE LETTER (OUTPATIENT)
Age: 72
End: 2022-09-10

## 2022-10-21 ENCOUNTER — HOSPITAL ENCOUNTER (OUTPATIENT)
Dept: MAMMOGRAPHY | Facility: CLINIC | Age: 72
Discharge: HOME | End: 2022-10-21
Attending: FAMILY MEDICINE | Admitting: FAMILY MEDICINE
Payer: COMMERCIAL

## 2022-10-21 DIAGNOSIS — Z12.31 VISIT FOR SCREENING MAMMOGRAM: ICD-10-CM

## 2022-10-21 PROCEDURE — 77067 SCR MAMMO BI INCL CAD: CPT

## 2022-10-21 NOTE — PROGRESS NOTES
"  SUBJECTIVE:   Madeleine Morales is a 72 year old female who presents for Preventive Visit.      Patient has been advised of split billing requirements and indicates understanding: Yes  Are you in the first 12 months of your Medicare Part B coverage?  No    Physical Health:    In general, how would you rate your overall physical health? excellent    Outside of work, how many days during the week do you exercise? none    Outside of work, approximately how many minutes a day do you exercise?15-30 minutes    If you drink alcohol do you typically have >3 drinks per day or >7 drinks per week? No    Do you usually eat at least 4 servings of fruit and vegetables a day, include whole grains & fiber and avoid regularly eating high fat or \"junk\" foods? Yes    Do you have any problems taking medications regularly?  No    Do you have any side effects from medications? none    Needs assistance for the following daily activities: no assistance needed    Which of the following safety concerns are present in your home?  none identified     Hearing impairment: No    In the past 6 months, have you been bothered by leaking of urine? yes  HEARING FREQUENCY:   Right Ear:     500 Hz :  pass   1000 Hz:  pass   2000 Hz:  pass   4000 Hz:  pass  Left Ear:     500 Hz :  pass   1000 Hz:  pass   2000 Hz:  pass   4000 Hz:  pass  Katia Mazariegos CMA 10/24/2022  Mental Health:    In general, how would you rate your overall mental or emotional health? excellent  PHQ-2 Score:      Do you feel safe in your environment? Yes    Have you ever done Advance Care Planning? (For example, a Health Directive, POLST, or a discussion with a medical provider or your loved ones about your wishes): Yes, patient states has an Advance Care Planning document and will bring a copy to the clinic.    Additional concerns to address?  YES    Fall risk:  Fallen 2 or more times in the past year?: No  Any fall with injury in the past year?: No    Cognitive Screening: "   1) Repeat 3 items (Leader, Season, Table)    2) Clock draw: NORMAL  3) 3 item recall: Recalls 2 objects   Results: NORMAL clock, 1-2 items recalled: COGNITIVE IMPAIRMENT LESS LIKELY    Mini-CogTM Copyright S Nicole. Licensed by the author for use in [facility]; reprinted with permission (rosaline@[facility]). All rights reserved.      Do you have sleep apnea, excessive snoring or daytime drowsiness?: no    Visit with gyne    PROBLEMS TO ADD ON...    Reviewed and updated as needed this visit by clinical staff   Tobacco  Allergies  Meds   Med Hx  Surg Hx  Fam Hx  Soc Hx        Reviewed and updated as needed this visit by Provider   Tobacco  Allergies    Med Hx  Surg Hx  Fam Hx  Soc Hx       Social History     Tobacco Use     Smoking status: Never     Smokeless tobacco: Never   Substance Use Topics     Alcohol use: Yes     Alcohol/week: 1.0 standard drink     Types: 1 Standard drinks or equivalent per week                           Current providers sharing in care for this patient include:   Patient Care Team:  Kb Lua MD as PCP - General (Family Practice)  Loy Harper MD as Assigned PCP    The following health maintenance items are reviewed in Epic and correct as of today:  Health Maintenance   Topic Date Due     DTAP/TDAP/TD IMMUNIZATION (3 - Td or Tdap) 01/01/2022     LIPID  02/14/2022     MEDICARE ANNUAL WELLNESS VISIT  10/24/2023     FALL RISK ASSESSMENT  10/24/2023     MAMMO SCREENING  10/21/2024     COLORECTAL CANCER SCREENING  12/29/2024     ADVANCE CARE PLANNING  10/24/2027     DEXA  07/24/2034     HEPATITIS C SCREENING  Completed     PHQ-2 (once per calendar year)  Completed     INFLUENZA VACCINE  Completed     Pneumococcal Vaccine: 65+ Years  Completed     ZOSTER IMMUNIZATION  Completed     COVID-19 Vaccine  Completed     IPV IMMUNIZATION  Aged Out     MENINGITIS IMMUNIZATION  Aged Out     HEPATITIS B IMMUNIZATION  Discontinued   Lab work is in processLabs reviewed  "in EPIC      ROS:  Constitutional, HEENT, cardiovascular, pulmonary, GI, , musculoskeletal, neuro, skin, endocrine and psych systems are negative, except as otherwise noted.    OBJECTIVE:   /73   Pulse 64   Resp 16   Ht 1.645 m (5' 4.75\")   Wt 60.2 kg (132 lb 12.8 oz)   SpO2 97%   BMI 22.27 kg/m   Estimated body mass index is 22.27 kg/m  as calculated from the following:    Height as of this encounter: 1.645 m (5' 4.75\").    Weight as of this encounter: 60.2 kg (132 lb 12.8 oz).  EXAM:   GENERAL APPEARANCE: healthy, alert and no distress  EYES: Eyes grossly normal to inspection, PERRL and conjunctivae and sclerae normal  HENT: ear canals and TM's normal, nose and mouth without ulcers or lesions, oropharynx clear and oral mucous membranes moist  NECK: no adenopathy, no asymmetry, masses, or scars and thyroid normal to palpation  RESP: lungs clear to auscultation - no rales, rhonchi or wheezes  BREAST: normal without masses, tenderness or nipple discharge and no palpable axillary masses or adenopathy  CV: regular rate and rhythm, normal S1 S2, no S3 or S4, no murmur, click or rub, no peripheral edema and peripheral pulses strong  ABDOMEN: soft, nontender, no hepatosplenomegaly, no masses and bowel sounds normal  MS: no musculoskeletal defects are noted and gait is age appropriate without ataxia  SKIN: no suspicious lesions or rashes  NEURO: Normal strength and tone, sensory exam grossly normal, mentation intact and speech normal  PSYCH: mentation appears normal and affect normal/bright    Diagnostic Test Results:  Labs reviewed in Epic    ASSESSMENT / PLAN:   Madeleine was seen today for physical, hearing screening, hypertension and musculoskeletal problem.    Diagnoses and all orders for this visit:    Encounter for Medicare annual wellness exam    Bronchiectasis in Right middle lobe and lingula without complication (H)\  Follow[facility] at the [facility]  Pulmonary docs want her to try and avoid zpacks for " "now.....  -     Comp. Metabolic Panel (14) ([facility])  -     CBC with Diff/Plt ([facility])    Mixed hyperlipidemia  Hyperlipidemia  Discussed current lipid results, previous results (if available) current guidelines (NCEP) for treatment and goals for lipids.    Discussed ongoing lifestyle modification, dietary changes (low fat, low simple carb) and regular aerobic exercise.    Discussed the link between dysmetabolic syndrome and impaired glucose tolerance seen in certain patterns of lipids.     Reviewed medication use for lipid lowering, including the statins are their possible side effects of myalgias, rhabdomyolysis, and liver toxicity.  We today managed his prescriptions with refills ensured to ensure availabilty of current medications.  Discussed the importance for aggressive management of dyslipidemia to prevent vascular complications later.     Instructed to contact me if she develop any intolerance to the treatment.    Onychomycosis  Not a big deal for her.    Ganglion of big toe  Remove or leave be is really up to her symptoms and she will see if she gets back into playing pickle  Other orders  -     Lipid Profile ([facility])        Patient has been advised of split billing requirements and indicates understanding: Yes    COUNSELING:  Reviewed preventive health counseling, as reflected in patient instructions    Estimated body mass index is 22.27 kg/m  as calculated from the following:    Height as of this encounter: 1.645 m (5' 4.75\").    Weight as of this encounter: 60.2 kg (132 lb 12.8 oz).        She reports that she has never smoked. She has never used smokeless tobacco.    Appropriate preventive services were discussed with this patient, including applicable screening as appropriate for cardiovascular disease, diabetes, osteopenia/osteoporosis, and glaucoma.  As appropriate for age/gender, discussed screening for colorectal cancer, prostate cancer, breast cancer, and cervical cancer. Checklist reviewing preventive " services available has been given to the patient.    Reviewed patients plan of care and provided an AVS. The Basic Care Plan (routine screening as documented in Health Maintenance) for Madeleine meets the Care Plan requirement. This Care Plan has been established and reviewed with the Patient.    Counseling Resources:  ATP IV Guidelines  Pooled Cohorts Equation Calculator  Breast Cancer Risk Calculator  BRCA-Related Cancer Risk Assessment: FHS-7 Tool  FRAX Risk Assessment  ICSI Preventive Guidelines  Dietary Guidelines for Americans, 2010  USDA's MyPlate  ASA Prophylaxis  Lung CA Screening    Kb Lua MD  [facility]

## 2022-10-21 NOTE — PATIENT INSTRUCTIONS
Patient Education   Personalized Prevention Plan  You are due for the preventive services outlined below.  Your care team is available to assist you in scheduling these services.  If you have already completed any of these items, please share that information with your care team to update in your medical record.  Health Maintenance Due   Topic Date Due    Diptheria Tetanus Pertussis (DTAP/TDAP/TD) Vaccine (3 - Td or Tdap) 01/01/2022    Cholesterol Lab  02/14/2022

## 2022-10-24 ENCOUNTER — OFFICE VISIT (OUTPATIENT)
Dept: FAMILY MEDICINE | Facility: CLINIC | Age: 72
End: 2022-10-24

## 2022-10-24 VITALS
SYSTOLIC BLOOD PRESSURE: 132 MMHG | HEART RATE: 64 BPM | DIASTOLIC BLOOD PRESSURE: 73 MMHG | OXYGEN SATURATION: 97 % | WEIGHT: 132.8 LBS | HEIGHT: 65 IN | BODY MASS INDEX: 22.13 KG/M2 | RESPIRATION RATE: 16 BRPM

## 2022-10-24 DIAGNOSIS — Z00.00 ENCOUNTER FOR MEDICARE ANNUAL WELLNESS EXAM: Primary | ICD-10-CM

## 2022-10-24 DIAGNOSIS — E78.2 MIXED HYPERLIPIDEMIA: ICD-10-CM

## 2022-10-24 DIAGNOSIS — J47.9 BRONCHIECTASIS WITHOUT COMPLICATION (H): ICD-10-CM

## 2022-10-24 DIAGNOSIS — B35.1 ONYCHOMYCOSIS: ICD-10-CM

## 2022-10-24 DIAGNOSIS — N18.31 STAGE 3A CHRONIC KIDNEY DISEASE (H): ICD-10-CM

## 2022-10-24 DIAGNOSIS — M67.40 GANGLION OF JOINT: ICD-10-CM

## 2022-10-24 PROBLEM — B02.29 OTHER POSTHERPETIC NERVOUS SYSTEM INVOLVEMENT: Status: ACTIVE | Noted: 2017-12-19

## 2022-10-24 PROBLEM — M85.80 OSTEOPENIA: Status: ACTIVE | Noted: 2018-02-14

## 2022-10-24 PROBLEM — M85.89 OSTEOPENIA OF MULTIPLE SITES: Status: ACTIVE | Noted: 2018-02-14

## 2022-10-24 LAB
% GRANULOCYTES: 59.4 % (ref 42.2–75.2)
CHOL/HDL RATIO (RMG): 2.2 MG/DL (ref 0–4.5)
CHOLESTEROL: 162 MG/DL (ref 100–199)
HCT VFR BLD AUTO: 39.7 % (ref 35–46)
HDL (RMG): 73 MG/DL (ref 40–?)
HEMOGLOBIN: 13.3 G/DL (ref 11.8–15.5)
LDL CALCULATED (RMG): 78 MG/DL (ref 0–130)
LYMPHOCYTES NFR BLD AUTO: 32 % (ref 20.5–51.1)
MCH RBC QN AUTO: 29.5 PG (ref 27–31)
MCHC RBC AUTO-ENTMCNC: 33.5 G/DL (ref 33–37)
MCV RBC AUTO: 87.9 FL (ref 80–100)
MONOCYTES NFR BLD AUTO: 8.6 % (ref 1.7–9.3)
PLATELET # BLD AUTO: 216 K/UL (ref 140–450)
RBC # BLD AUTO: 4.51 X10/CMM (ref 3.7–5.2)
TRIGLYCERIDES (RMG): 57 MG/DL (ref 0–149)
WBC # BLD AUTO: 4.9 X10/CMM (ref 3.8–11)

## 2022-10-24 PROCEDURE — G0439 PPPS, SUBSEQ VISIT: HCPCS | Performed by: FAMILY MEDICINE

## 2022-10-24 PROCEDURE — 99214 OFFICE O/P EST MOD 30 MIN: CPT | Mod: 25 | Performed by: FAMILY MEDICINE

## 2022-10-24 PROCEDURE — 80061 LIPID PANEL: CPT | Mod: QW | Performed by: FAMILY MEDICINE

## 2022-10-24 PROCEDURE — 36415 COLL VENOUS BLD VENIPUNCTURE: CPT | Performed by: FAMILY MEDICINE

## 2022-10-24 PROCEDURE — 85025 COMPLETE CBC W/AUTO DIFF WBC: CPT | Performed by: FAMILY MEDICINE

## 2022-10-24 RX ORDER — METRONIDAZOLE 10 MG/G
GEL TOPICAL
COMMUNITY
End: 2023-08-11

## 2022-10-25 LAB
ALBUMIN SERPL-MCNC: 4.7 G/DL (ref 3.7–4.7)
ALBUMIN/GLOB SERPL: 3.1 {RATIO} (ref 1.2–2.2)
ALP SERPL-CCNC: 84 IU/L (ref 44–121)
ALT SERPL-CCNC: 19 IU/L (ref 0–32)
AST SERPL-CCNC: 23 IU/L (ref 0–40)
BILIRUB SERPL-MCNC: 0.7 MG/DL (ref 0–1.2)
BUN SERPL-MCNC: 15 MG/DL (ref 8–27)
BUN/CREATININE RATIO: 19 (ref 12–28)
CALCIUM SERPL-MCNC: 9.5 MG/DL (ref 8.7–10.3)
CHLORIDE SERPLBLD-SCNC: 103 MMOL/L (ref 96–106)
CREAT SERPL-MCNC: 0.79 MG/DL (ref 0.57–1)
EGFR: 78 ML/MIN/1.73
GLOBULIN, TOTAL: 1.5 G/DL (ref 1.5–4.5)
GLUCOSE SERPL-MCNC: 83 MG/DL (ref 70–99)
POTASSIUM SERPL-SCNC: 4.3 MMOL/L (ref 3.5–5.2)
PROT SERPL-MCNC: 6.2 G/DL (ref 6–8.5)
SODIUM SERPL-SCNC: 142 MMOL/L (ref 134–144)
TOTAL CO2: 25 MMOL/L (ref 20–29)

## 2022-10-26 NOTE — RESULT ENCOUNTER NOTE
Dear Madeleine,     I am writing to report that your included test results are as expected.    Many labs contain some results that are slightly outside of the normal range, I have reviewed any of these results and they require no changes at this time.    Kb Lua MD

## 2022-11-22 DIAGNOSIS — E78.00 PURE HYPERCHOLESTEROLEMIA: ICD-10-CM

## 2022-11-23 RX ORDER — SIMVASTATIN 20 MG
20 TABLET ORAL AT BEDTIME
Qty: 90 TABLET | Refills: 3 | Status: SHIPPED | OUTPATIENT
Start: 2022-11-23 | End: 2023-09-29

## 2022-12-09 LAB — RETINOPATHY: NORMAL

## 2023-08-11 ENCOUNTER — OFFICE VISIT (OUTPATIENT)
Dept: FAMILY MEDICINE | Facility: CLINIC | Age: 73
End: 2023-08-11

## 2023-08-11 VITALS
HEIGHT: 65 IN | DIASTOLIC BLOOD PRESSURE: 73 MMHG | HEART RATE: 73 BPM | SYSTOLIC BLOOD PRESSURE: 141 MMHG | WEIGHT: 131.4 LBS | OXYGEN SATURATION: 97 % | BODY MASS INDEX: 21.89 KG/M2

## 2023-08-11 DIAGNOSIS — R39.9 LOWER URINARY TRACT SYMPTOMS (LUTS): Primary | ICD-10-CM

## 2023-08-11 LAB
BACTERIA (RMG): ABNORMAL
BILIRUBIN (RMG): NEGATIVE
BLOOD (RMG): NEGATIVE
CASTS (RMG): ABNORMAL
COLOR UR: YELLOW
CRYSTAL (RMG): ABNORMAL
EPITHELIAL (RMG): ABNORMAL
GLUCOSE (RMG): NEGATIVE
KETONE (RMG): NEGATIVE
LEUKOCYTE (RMG): ABNORMAL
MUCOUS (RMG): ABNORMAL
NITRITE (RMG): NEGATIVE
PH UR STRIP: 7 PH (ref 5–7)
PROTEIN (RMG): NEGATIVE
RBC (RMG): ABNORMAL
SP GR UR STRIP: 1.01
UROBILINOGEN (RMG): 0.2
WBC (RMG): ABNORMAL

## 2023-08-11 PROCEDURE — 99213 OFFICE O/P EST LOW 20 MIN: CPT | Performed by: FAMILY MEDICINE

## 2023-08-11 PROCEDURE — 87086 URINE CULTURE/COLONY COUNT: CPT | Performed by: FAMILY MEDICINE

## 2023-08-11 PROCEDURE — 81003 URINALYSIS AUTO W/O SCOPE: CPT | Performed by: FAMILY MEDICINE

## 2023-08-11 PROCEDURE — 87186 SC STD MICRODIL/AGAR DIL: CPT | Performed by: FAMILY MEDICINE

## 2023-08-11 RX ORDER — NITROFURANTOIN 25; 75 MG/1; MG/1
100 CAPSULE ORAL 2 TIMES DAILY
Qty: 10 CAPSULE | Refills: 0 | Status: SHIPPED | OUTPATIENT
Start: 2023-08-11 | End: 2023-08-16

## 2023-08-11 NOTE — PROGRESS NOTES
"SUBJECTIVE:      Madeleine Morales, is a 73 year old female presenting for lower urinary symptoms. 1 day. Lower abdominal pressure. Dysuria. No fevers or chills. Reminiscent to her of prior UTIs.      OBJECTIVE:  Vitals:    08/11/23 1608   BP: (!) 141/73   Pulse: 73   SpO2: 97%   Weight: 59.6 kg (131 lb 6.4 oz)   Height: 1.645 m (5' 4.75\")    Body mass index is 22.04 kg/m .  General: no acute distress, cooperative with exam, non toxic appearing  Lungs: clear to auscultation bilaterally, normal respiratory effort.  Heart: regular rate, normal S1 and S2, no murmurs.   Abdomen: normal bowel sounds, nontender, no palpable organomegaly, no CVA tenderness  Extremities: warm, perfused, no swelling or edema.      Results for orders placed or performed in visit on 08/11/23 (from the past 24 hour(s))   Urinalysis ([facility])   Result Value Ref Range    Color Urine Yellow     pH Urine 7.0 pH    Specific Gravity Urine 1.010     PROTEIN ([facility]) Negative Negative    GLUCOSE ([facility]) Negative Negative    KETONE ([facility]) Negative Negative    LEUKOCYTE ([facility]) 1+ (A) Negative    BLOOD ([facility]) Negative Negative    Nitrite ([facility]) Negative Negative, Positive    BILIRUBIN ([facility]) Negative Negative    UROBILINOGEN ([facility]) 0.2 0.2 - 1    WBC ([facility]) 3-5 (A)     RBC ([facility]) 0-3     CRYSTAL ([facility]) None     BACTERIA ([facility]) Rare     MUCOUS ([facility]) None     CASTS ([facility]) None     EPITHELIAL ([facility]) Few        ASSESSMENT / PLAN:        Lower urinary tract symptoms (LUTS)  If the symptoms are not cleared with the prescribed treatment, contact us for further evaluation.   -     Urinalysis ([facility])  -     nitroFURantoin macrocrystal-monohydrate (MACROBID) 100 MG capsule; Take 1 capsule (100 mg) by mouth 2 times daily for 5 days  -     Urine Culture; Future        "

## 2023-08-13 LAB — BACTERIA UR CULT: ABNORMAL

## 2023-09-29 DIAGNOSIS — E78.00 PURE HYPERCHOLESTEROLEMIA: ICD-10-CM

## 2023-09-29 RX ORDER — SIMVASTATIN 20 MG
20 TABLET ORAL AT BEDTIME
Qty: 90 TABLET | Refills: 3 | Status: SHIPPED | OUTPATIENT
Start: 2023-09-29

## 2023-10-23 ENCOUNTER — HOSPITAL ENCOUNTER (OUTPATIENT)
Dept: MAMMOGRAPHY | Facility: CLINIC | Age: 73
Discharge: HOME | End: 2023-10-23
Attending: FAMILY MEDICINE | Admitting: FAMILY MEDICINE
Payer: COMMERCIAL

## 2023-10-23 DIAGNOSIS — Z12.31 VISIT FOR SCREENING MAMMOGRAM: ICD-10-CM

## 2023-10-23 PROCEDURE — 77067 SCR MAMMO BI INCL CAD: CPT

## 2023-10-24 NOTE — PROGRESS NOTES
"SUBJECTIVE:   Patient today who presents for Preventive Visit.       Patient has been advised of split billing requirements and indicates understanding: Yes  Are you in the first 12 months of your Medicare Part B coverage?  No    Physical Health:  In general, how would you rate your overall physical health? excellent  Outside of work, how many days during the week do you exercise? 6-7 days/week walks   Outside of work, approximately how many minutes a day do you exercise?15-30 minutes  If you drink alcohol do you typically have >3 drinks per day or >7 drinks per week? No  Do you usually eat at least 4 servings of fruit and vegetables a day, include whole grains & fiber and avoid regularly eating high fat or \"junk\" foods? Yes  Do you have any problems taking medications regularly?  No  Do you have any side effects from medications? none  Needs assistance for the following daily activities: no assistance needed  Which of the following safety concerns are present in your home?  none identified   Hearing impairment: No  In the past 6 months, have you been bothered by leaking of urine? yes  Mental Health:  In general, how would you rate your overall mental or emotional health? good  Do you feel safe in your environment? Yes  Have you ever done Advance Care Planning? (For example, a Health Directive, POLST, or a discussion with a medical provider or your loved ones about your wishes): Yes, patient states has an Advance Care Planning document and will bring a copy to the clinic.  Do you have sleep apnea, excessive snoring or daytime drowsiness? : no  Healthy Habits:  Have you had an eye exam in the past two years? yes   Do you see a dentist twice per year? yes   What is your current smoking status? no  Do you use smokeless tobacco? no  Abuse: Current or Past(Physical, Sexual or Emotional)- No   HEARING FREQUENCY:   Right Ear:     500 Hz :  pass   1000 Hz:  pass   2000 Hz:  fail   4000 Hz:  pass  Left Ear:     500 Hz :  " pass   1000 Hz:  pass   2000 Hz:  fail   4000 Hz:  pass  Katia Mazariegos CMA 10/25/2023  PHQ-2 Score:    Fall risk:  Fallen 2 or more times in the past year?: No  Any fall with injury in the past year?: No    Cognitive Screenin) Repeat 3 items (Leader, Season, Table)    2) Clock draw: NORMAL  3) 3 item recall: Recalls 3 objects  Results: 3 items recalled: COGNITIVE IMPAIRMENT LESS LIKELY    Mini-CogTM Copyright SIMI Rivera. Licensed by the author for use in [facility] Hello Chair; reprinted with permission (rosaline@[facility]). All rights reserved.      Additional concerns to address?  No        PROBLEMS TO ADD ON...  Bronchiectasis  Hyperchol      Reviewed and updated as needed this visit by clinical staff   Tobacco  Allergies  Meds  Problems             Reviewed and updated as needed this visit by Provider    Allergies   Problems            Social History     Tobacco Use    Smoking status: Never    Smokeless tobacco: Never   Substance Use Topics    Alcohol use: Yes     Alcohol/week: 1.0 standard drink of alcohol     Types: 1 Standard drinks or equivalent per week              No data to display                   No data to display              Do you have a current opioid prescription? No  Do you use any other controlled substances or medications that are not prescribed by a provider? None                      Current providers sharing in care for this patient include:   Patient Care Team:  Kb Lua MD as PCP - General (Family Practice)  Kb Lua MD as Assigned PCP    The following health maintenance items are reviewed in Epic and correct as of today:  Health Maintenance   Topic Date Due    RSV VACCINE 60+ (1 - 1-dose 60+ series) Never done    MEDICARE ANNUAL WELLNESS VISIT  10/25/2024    FALL RISK ASSESSMENT  10/25/2024    COLORECTAL CANCER SCREENING  2024    MAMMO SCREENING  10/23/2025    LIPID  10/24/2027    ADVANCE CARE PLANNING  10/25/2028    DTAP/TDAP/TD IMMUNIZATION (2  "- Td or Tdap) 04/21/2033    DEXA  07/24/2034    HEPATITIS C SCREENING  Completed    PHQ-2 (once per calendar year)  Completed    INFLUENZA VACCINE  Completed    Pneumococcal Vaccine: 65+ Years  Completed    ZOSTER IMMUNIZATION  Completed    COVID-19 Vaccine  Completed    IPV IMMUNIZATION  Aged Out    HPV IMMUNIZATION  Aged Out    MENINGITIS IMMUNIZATION  Aged Out     Lab work is in process      ROS:  Constitutional, HEENT, cardiovascular, pulmonary, GI, , musculoskeletal, neuro, skin, endocrine and psych systems are negative, except as otherwise noted.    OBJECTIVE:   BP (!) 141/76   Pulse 60   Ht 1.664 m (5' 5.5\")   Wt 59.7 kg (131 lb 9.6 oz)   SpO2 97%   BMI 21.57 kg/m     Estimated body mass index is 21.57 kg/m  as calculated from the following:    Height as of this encounter: 1.664 m (5' 5.5\").    Weight as of this encounter: 59.7 kg (131 lb 9.6 oz).  EXAM:   GENERAL APPEARANCE: healthy, alert and no distress  EYES: Eyes grossly normal to inspection, PERRL and conjunctivae and sclerae normal  HENT: ear canals and TM's normal, nose and mouth without ulcers or lesions, oropharynx clear and oral mucous membranes moist  NECK: no adenopathy, no asymmetry, masses, or scars and thyroid normal to palpation  RESP: lungs clear to auscultation - no rales, rhonchi or wheezes  CV: regular rate and rhythm, normal S1 S2, no S3 or S4, no murmur, click or rub, no peripheral edema and peripheral pulses strong  ABDOMEN: soft, nontender, no hepatosplenomegaly, no masses and bowel sounds normal  MS: no musculoskeletal defects are noted and gait is age appropriate without ataxia  SKIN: no suspicious lesions or rashes  NEURO: Normal strength and tone, sensory exam grossly normal, mentation intact and speech normal  PSYCH: mentation appears normal and affect normal/bright    Diagnostic Test Results:  Labs reviewed in Epic    ASSESSMENT / PLAN:   Madeleine was seen today for physical and hearing screening.    Diagnoses and all orders " for this visit:    Encounter for Medicare annual wellness exam  -     VENOUS COLLECTION  -     CBC with Diff/Plt ([facility])  -     Comprehensive metabolic panel; Future    Pure hypercholesterolemia  Hyperlipidemia  Discussed current lipid results, previous results (if available) current guidelines (NCEP) for treatment and goals for lipids.    Discussed ongoing lifestyle modification, dietary changes (low fat, low simple carb) and regular aerobic exercise.    Discussed the link between dysmetabolic syndrome and impaired glucose tolerance seen in certain patterns of lipids.     Reviewed medication use for lipid lowering, including the statins are their possible side effects of myalgias, rhabdomyolysis, and liver toxicity.  We today managed his prescriptions with refills ensured to ensure availabilty of current medications.  Discussed the importance for aggressive management of dyslipidemia to prevent vascular complications later.     Instructed to contact me if she develop any intolerance to the treatment.  -     Lipid Profile ([facility])    Bronchiectasis without complication (H)  Being actively managed by Dr. Ponce pulmonary    Stage 2 chronic kidney disease  Chronic kidney disease due to HTN and DM.  Check urine for protein.   Patient is noton ACE/ARB.  Patient is on a statin.  Told the patient to avoid NSAIDs if at all possible.   Will monitor closely and send to Nephrologist if renal function continues to decline.      HSV (herpes simplex virus) infection  Daily treatment   -     valACYclovir (VALTREX) 500 MG tablet; Take 1 tablet (500 mg) by mouth daily    Atherosclerosis of renal artery (H24)  On statin and asa 81      Patient has been advised of split billing requirements and indicates understanding: Yes    COUNSELING:  Reviewed preventive health counseling, as reflected in patient instructions       Bladder control       Fall risk prevention    Estimated body mass index is 21.57 kg/m  as calculated from the  "following:    Height as of this encounter: 1.664 m (5' 5.5\").    Weight as of this encounter: 59.7 kg (131 lb 9.6 oz).        He reports that she has never smoked. She has never used smokeless tobacco.    I have reviewed Opioid Use Disorder and Substance Use Disorder risk factors and made any needed referrals.   Appropriate preventive services were discussed with this patient, including applicable screening as appropriate for cardiovascular disease, diabetes, osteopenia/osteoporosis, and glaucoma.  As appropriate for age/gender, discussed screening for colorectal cancer, prostate cancer, breast cancer, and cervical cancer. Checklist reviewing preventive services available has been given to the patient.    Reviewed patients plan of care and provided an AVS. The Basic Care Plan (routine screening as documented in Health Maintenance) for Cecil meets the Care Plan requirement. This Care Plan has been established and reviewed with the Patient.    Counseling Resources:  ATP IV Guidelines  Pooled Cohorts Equation Calculator  Breast Cancer Risk Calculator  BRCA-Related Cancer Risk Assessment: FHS-7 Tool  FRAX Risk Assessment  ICSI Preventive Guidelines  Dietary Guidelines for Americans, 2010  USDA's MyPlate  ASA Prophylaxis  Lung CA Screening    Kb Lua MD  [facility]  "

## 2023-10-24 NOTE — PATIENT INSTRUCTIONS
Patient Education   Personalized Prevention Plan  You are due for the preventive services outlined below.  Your care team is available to assist you in scheduling these services.  If you have already completed any of these items, please share that information with your care team to update in your medical record.  Health Maintenance Due   Topic Date Due     Kidney Microalbumin Urine Test  Never done     RSV VACCINE 60+ (1 - 1-dose 60+ series) Never done     Basic Metabolic Panel  10/24/2023     Cholesterol Lab  10/24/2023     FALL RISK ASSESSMENT  10/24/2023     Hemoglobin  10/24/2023

## 2023-10-25 ENCOUNTER — OFFICE VISIT (OUTPATIENT)
Dept: FAMILY MEDICINE | Facility: CLINIC | Age: 73
End: 2023-10-25

## 2023-10-25 VITALS
BODY MASS INDEX: 21.15 KG/M2 | SYSTOLIC BLOOD PRESSURE: 141 MMHG | WEIGHT: 131.6 LBS | HEART RATE: 60 BPM | OXYGEN SATURATION: 97 % | HEIGHT: 66 IN | DIASTOLIC BLOOD PRESSURE: 76 MMHG

## 2023-10-25 DIAGNOSIS — N18.2 STAGE 2 CHRONIC KIDNEY DISEASE: ICD-10-CM

## 2023-10-25 DIAGNOSIS — E78.00 PURE HYPERCHOLESTEROLEMIA: ICD-10-CM

## 2023-10-25 DIAGNOSIS — Z00.00 ENCOUNTER FOR MEDICARE ANNUAL WELLNESS EXAM: Primary | ICD-10-CM

## 2023-10-25 DIAGNOSIS — I70.1 ATHEROSCLEROSIS OF RENAL ARTERY (H): ICD-10-CM

## 2023-10-25 DIAGNOSIS — B00.9 HSV (HERPES SIMPLEX VIRUS) INFECTION: ICD-10-CM

## 2023-10-25 DIAGNOSIS — J47.9 BRONCHIECTASIS WITHOUT COMPLICATION (H): ICD-10-CM

## 2023-10-25 PROBLEM — N18.31 STAGE 3A CHRONIC KIDNEY DISEASE (H): Status: RESOLVED | Noted: 2022-10-24 | Resolved: 2023-10-25

## 2023-10-25 LAB
% GRANULOCYTES: 66.1 % (ref 42.2–75.2)
ALBUMIN SERPL BCG-MCNC: 4.6 G/DL (ref 3.5–5.2)
ALP SERPL-CCNC: 70 U/L (ref 35–104)
ALT SERPL W P-5'-P-CCNC: 22 U/L (ref 0–50)
ANION GAP SERPL CALCULATED.3IONS-SCNC: 8 MMOL/L (ref 7–15)
AST SERPL W P-5'-P-CCNC: 30 U/L (ref 0–45)
BILIRUB SERPL-MCNC: 0.7 MG/DL
BUN SERPL-MCNC: 12.3 MG/DL (ref 8–23)
CALCIUM SERPL-MCNC: 9.4 MG/DL (ref 8.8–10.2)
CHLORIDE SERPL-SCNC: 103 MMOL/L (ref 98–107)
CHOLESTEROL: 182 MG/DL (ref 100–199)
CREAT SERPL-MCNC: 0.8 MG/DL (ref 0.51–0.95)
DEPRECATED HCO3 PLAS-SCNC: 29 MMOL/L (ref 22–29)
EGFRCR SERPLBLD CKD-EPI 2021: 76 ML/MIN/1.73M2
FASTING?: YES
GLUCOSE SERPL-MCNC: 97 MG/DL (ref 70–99)
HCT VFR BLD AUTO: 42.5 % (ref 35–46)
HDL (RMG): 66 MG/DL (ref 40–?)
HEMOGLOBIN: 13.6 G/DL (ref 11.8–15.5)
LDL CALCULATED (RMG): 100 MG/DL (ref 0–130)
LYMPHOCYTES NFR BLD AUTO: 25.7 % (ref 20.5–51.1)
MCH RBC QN AUTO: 29.2 PG (ref 27–31)
MCHC RBC AUTO-ENTMCNC: 32.1 G/DL (ref 33–37)
MCV RBC AUTO: 90.8 FL (ref 80–100)
MONOCYTES NFR BLD AUTO: 8.2 % (ref 1.7–9.3)
PLATELET # BLD AUTO: 214 K/UL (ref 140–450)
POTASSIUM SERPL-SCNC: 4.3 MMOL/L (ref 3.4–5.3)
PROT SERPL-MCNC: 6.6 G/DL (ref 6.4–8.3)
RBC # BLD AUTO: 4.68 X10/CMM (ref 3.7–5.2)
SODIUM SERPL-SCNC: 140 MMOL/L (ref 135–145)
TRIGLYCERIDES (RMG): 82 MG/DL (ref 0–149)
WBC # BLD AUTO: 5.3 X10/CMM (ref 3.8–11)

## 2023-10-25 PROCEDURE — 36415 COLL VENOUS BLD VENIPUNCTURE: CPT | Performed by: FAMILY MEDICINE

## 2023-10-25 PROCEDURE — 80061 LIPID PANEL: CPT | Mod: QW | Performed by: FAMILY MEDICINE

## 2023-10-25 PROCEDURE — 80053 COMPREHEN METABOLIC PANEL: CPT | Performed by: FAMILY MEDICINE

## 2023-10-25 PROCEDURE — 99213 OFFICE O/P EST LOW 20 MIN: CPT | Mod: 25 | Performed by: FAMILY MEDICINE

## 2023-10-25 PROCEDURE — G0439 PPPS, SUBSEQ VISIT: HCPCS | Performed by: FAMILY MEDICINE

## 2023-10-25 PROCEDURE — 85025 COMPLETE CBC W/AUTO DIFF WBC: CPT | Performed by: FAMILY MEDICINE

## 2023-10-25 RX ORDER — VALACYCLOVIR HYDROCHLORIDE 500 MG/1
500 TABLET, FILM COATED ORAL DAILY
Qty: 90 TABLET | Refills: 3 | Status: SHIPPED | OUTPATIENT
Start: 2023-10-25

## 2023-12-21 ENCOUNTER — TRANSFERRED RECORDS (OUTPATIENT)
Dept: FAMILY MEDICINE | Facility: CLINIC | Age: 73
End: 2023-12-21

## 2023-12-21 LAB — RETINOPATHY: NORMAL

## 2024-03-12 ENCOUNTER — TELEPHONE (OUTPATIENT)
Dept: FAMILY MEDICINE | Facility: CLINIC | Age: 74
End: 2024-03-12

## 2024-04-23 NOTE — PROGRESS NOTES
"Answers submitted by the patient for this visit:  General Questionnaire (Submitted on 4/23/2024)  Chief Complaint: Chronic problems general questions HPI Form  What is the reason for your visit today? : Follow up per Dr.  How many servings of fruits and vegetables do you eat daily?: 2-3  On average, how many sweetened beverages do you drink each day (Examples: soda, juice, sweet tea, etc.  Do NOT count diet or artificially sweetened beverages)?: 0  How many minutes a day do you exercise enough to make your heart beat faster?: 10 to 19  How many days a week do you exercise enough to make your heart beat faster?: 3 or less  How many days per week do you miss taking your medication?: 0  Problem(s) Oriented visit        SUBJECTIVE:                                                    Madeleine Morales is a 74 year old female who presents to clinic today for the following health issues :  RECHECK (Rechecking from cpx in 10/2023 kidneys? )        1. Bronchiectasis without complication (H)    2. Atherosclerosis of renal artery (H24)  On simvastatin    3. Early dry stage nonexudative age-related macular degeneration of left eye  Followin with Eye doc.         Problem list, Medication list, Allergies, and Medical/Social/Surgical histories reviewed in [facility] and updated as appropriate.   Additional history: as documented    ROS:  General and Resp. completed and negative except as noted above    Histories: reviewed    OBJECTIVE:                                                    /80   Pulse 69   Ht 1.664 m (5' 5.5\")   Wt 58.1 kg (128 lb)   SpO2 99%   BMI 20.98 kg/m    Body mass index is 20.98 kg/m .   APPEARANCE: = Relaxed and in no distress  Ears/Nose = External structures and Nares have usual shape and form  Ear canals and TM's = Canals are not inflammed and have none or little wax and the drums are not injected and have a light reflex   Neck = No anterior or posterior adenopathy appreciated.  Thyroid = Not enlarged and " no masses felt     ASSESSMENT/PLAN:                                                    Quality gaps reviewed    Madeleine was seen today for recheck.    Diagnoses and all orders for this visit:    Early dry stage nonexudative age-related macular degeneration of left eye  Continue work with opthomolgy  Atherosclerosis of renal artery (H24)  We will continue to aggressively manage secondary risk factors, including aggressive BP control (under 130/ideally), aggressive LDL lowering with statin (goal under 100 for sure/under 70 ideally), no smoking, diabetes prevention/management, no smoking, and use of either ASA or similar anti platelet agent if tolerated.     Bronchiectasis (H) Followed at [facility], last visit 4/2023         Work on weight loss    Health maintenance items are reviewed in Epic and correct as of today:    Kb Lua MD  [facility]  Family Practice  [facility]  [phone]    For any issues my office # is [phone]

## 2024-04-24 ENCOUNTER — OFFICE VISIT (OUTPATIENT)
Dept: FAMILY MEDICINE | Facility: CLINIC | Age: 74
End: 2024-04-24

## 2024-04-24 VITALS
BODY MASS INDEX: 20.57 KG/M2 | OXYGEN SATURATION: 99 % | WEIGHT: 128 LBS | DIASTOLIC BLOOD PRESSURE: 80 MMHG | HEART RATE: 69 BPM | HEIGHT: 66 IN | SYSTOLIC BLOOD PRESSURE: 133 MMHG

## 2024-04-24 DIAGNOSIS — J47.9 BRONCHIECTASIS WITHOUT COMPLICATION (H): ICD-10-CM

## 2024-04-24 DIAGNOSIS — H35.3121 EARLY DRY STAGE NONEXUDATIVE AGE-RELATED MACULAR DEGENERATION OF LEFT EYE: Primary | ICD-10-CM

## 2024-04-24 DIAGNOSIS — I70.1 ATHEROSCLEROSIS OF RENAL ARTERY (H): ICD-10-CM

## 2024-04-24 PROCEDURE — 99214 OFFICE O/P EST MOD 30 MIN: CPT | Performed by: FAMILY MEDICINE

## 2024-04-24 PROCEDURE — G2211 COMPLEX E/M VISIT ADD ON: HCPCS | Performed by: FAMILY MEDICINE

## 2024-10-24 SDOH — HEALTH STABILITY: PHYSICAL HEALTH: ON AVERAGE, HOW MANY DAYS PER WEEK DO YOU ENGAGE IN MODERATE TO STRENUOUS EXERCISE (LIKE A BRISK WALK)?: 5 DAYS

## 2024-10-24 SDOH — HEALTH STABILITY: PHYSICAL HEALTH: ON AVERAGE, HOW MANY MINUTES DO YOU ENGAGE IN EXERCISE AT THIS LEVEL?: 20 MIN

## 2024-10-24 ASSESSMENT — SOCIAL DETERMINANTS OF HEALTH (SDOH): HOW OFTEN DO YOU GET TOGETHER WITH FRIENDS OR RELATIVES?: MORE THAN THREE TIMES A WEEK

## 2024-10-28 DIAGNOSIS — E78.00 PURE HYPERCHOLESTEROLEMIA: ICD-10-CM

## 2024-10-28 RX ORDER — SIMVASTATIN 20 MG
20 TABLET ORAL AT BEDTIME
Qty: 90 TABLET | Refills: 3 | Status: SHIPPED | OUTPATIENT
Start: 2024-10-28 | End: 2024-10-30

## 2024-10-28 NOTE — TELEPHONE ENCOUNTER
Med: simvastatin    LOV (related): 10/25/2023    Last Lab: 10/25/2023      Due for F/U around: Due for MAW    Next Appt: 10/31/2024        Cholesterol   Date Value Ref Range Status   10/25/2023 182 100 - 199 mg/dL Final   10/24/2022 162 100 - 199 mg/dl Final   02/14/2017 164 0 - 200 mg/dL Final   09/02/2014 183 115 - 199 mg/dL Final     HDL Cholesterol   Date Value Ref Range Status   02/14/2017 75 >50 mg/dL Final   09/02/2014 74 mg/dL Final     HDL   Date Value Ref Range Status   10/25/2023 66 >=40 mg/dL Final   10/24/2022 73 >=40 mg/dl Final     LDL Cholesterol Calculated   Date Value Ref Range Status   02/14/2017 76 <130 mg/dL Final   09/02/2014 95 mg/dL Final     LDL CALCULATED ([facility])   Date Value Ref Range Status   10/25/2023 100 0 - 130 mg/dL Final   10/24/2022 78 0 - 130 mg/dl Final     Triglycerides   Date Value Ref Range Status   10/25/2023 82 0 - 149 mg/dL Final   10/24/2022 57 0 - 149 mg/dl Final   02/14/2017 64 <150 mg/dL Final   09/02/2014 72 mg/dL Final     Cholesterol/HDL Ratio   Date Value Ref Range Status   09/02/2014 n/a  Final

## 2024-10-30 NOTE — PATIENT INSTRUCTIONS
Wound Care Instructions for Liquid Nitrogen Treatment   The treatment area will appear white at first. Over the next several hours a fluid-filled blister may form. The blister can be very dark. If blister appears, it will remain blistered for about a week. Then a scab will form over the area.   Leave the blistered area uncovered as long as it remains closed. If the area breaks open, you may cover it with a clean band aid. Do not pull off the skin covering the blister.   If the blistered area becomes uncomfortably filled with fluid, you may release some of the fluid by puncturing the blister with a needle that has been cleansed with alcohol.   If the skin covering the blister comes off, clean the area daily with soap and water. Apply a small amount of Vaseline and then cover with a clean band aid. Change the band aid twice daily until the skin is completely healed.   Call us if.....   1. You have signs of infection such as thick yellow or pus-like drainage from the wound site or a fever over 100 degrees Fahrenheit.   2. You have any questions or are not sure how to take care of the wound.    Use your BP cuff.  Keep a log of the results,  Come back in 1month and bring the cuff so we can validate the accuracy.    Hypertension   What is hypertension?   Hypertension is blood pressure that keeps being higher than normal. Blood pressure is the force of blood against artery walls as the heart pumps blood through the body. Blood pressure can be unhealthy if it is above 120/80. The higher your blood pressure, the greater the health risk.   High blood pressure can be controlled if you take these steps:   Maintain a healthy weight.   Are physically active.   Follow a healthy eating plan, which includes foods that do not have a lot of salt and sodium.   Do not drink a lot of alcohol.   Our goal is to keep the systolic (top) number 128 or lower and the diastolic (bottom) number 83 or lower    Patient Education   Preventive Care  Advice   This is general advice given by our system to help you stay healthy. However, your care team may have specific advice just for you. Please talk to your care team about your preventive care needs.  Nutrition  Eat 5 or more servings of fruits and vegetables each day.  Try wheat bread, brown rice and whole grain pasta (instead of white bread, rice, and pasta).  Get enough calcium and vitamin D. Check the label on foods and aim for 100% of the RDA (recommended daily allowance).  Lifestyle  Exercise at least 150 minutes each week  (30 minutes a day, 5 days a week).  Do muscle strengthening activities 2 days a week. These help control your weight and prevent disease.  No smoking.  Wear sunscreen to prevent skin cancer.  Have a dental exam and cleaning every 6 months.  Yearly exams  See your health care team every year to talk about:  Any changes in your health.  Any medicines your care team has prescribed.  Preventive care, family planning, and ways to prevent chronic diseases.  Shots (vaccines)   HPV shots (up to age 26), if you've never had them before.  Hepatitis B shots (up to age 59), if you've never had them before.  COVID-19 shot: Get this shot when it's due.  Flu shot: Get a flu shot every year.  Tetanus shot: Get a tetanus shot every 10 years.  Pneumococcal, hepatitis A, and RSV shots: Ask your care team if you need these based on your risk.  Shingles shot (for age 50 and up)  General health tests  Diabetes screening:  Starting at age 35, Get screened for diabetes at least every 3 years.  If you are younger than age 35, ask your care team if you should be screened for diabetes.  Cholesterol test: At age 39, start having a cholesterol test every 5 years, or more often if advised.  Bone density scan (DEXA): At age 50, ask your care team if you should have this scan for osteoporosis (brittle bones).  Hepatitis C: Get tested at least once in your life.  STIs (sexually transmitted infections)  Before age 24:  Ask your care team if you should be screened for STIs.  After age 24: Get screened for STIs if you're at risk. You are at risk for STIs (including HIV) if:  You are sexually active with more than one person.  You don't use condoms every time.  You or a partner was diagnosed with a sexually transmitted infection.  If you are at risk for HIV, ask about PrEP medicine to prevent HIV.  Get tested for HIV at least once in your life, whether you are at risk for HIV or not.  Cancer screening tests  Cervical cancer screening: If you have a cervix, begin getting regular cervical cancer screening tests starting at age 21.  Breast cancer scan (mammogram): If you've ever had breasts, begin having regular mammograms starting at age 40. This is a scan to check for breast cancer.  Colon cancer screening: It is important to start screening for colon cancer at age 45.  Have a colonoscopy test every 10 years (or more often if you're at risk) Or, ask your provider about stool tests like a FIT test every year or Cologuard test every 3 years.  To learn more about your testing options, visit:   .  For help making a decision, visit:   https://bit.ly/vu81287.  Prostate cancer screening test: If you have a prostate, ask your care team if a prostate cancer screening test (PSA) at age 55 is right for you.  Lung cancer screening: If you are a current or former smoker ages 50 to 80, ask your care team if ongoing lung cancer screenings are right for you.  For informational purposes only. Not to replace the advice of your health care provider. Copyright   2023 [facility] [employer]. All rights reserved. Clinically reviewed by the [facility]. [employer] 250408 - REV 01/24.

## 2024-10-31 ENCOUNTER — OFFICE VISIT (OUTPATIENT)
Dept: FAMILY MEDICINE | Facility: CLINIC | Age: 74
End: 2024-10-31

## 2024-10-31 VITALS
BODY MASS INDEX: 21.7 KG/M2 | OXYGEN SATURATION: 97 % | HEART RATE: 65 BPM | WEIGHT: 132.4 LBS | DIASTOLIC BLOOD PRESSURE: 78 MMHG | SYSTOLIC BLOOD PRESSURE: 164 MMHG

## 2024-10-31 DIAGNOSIS — I10 ESSENTIAL HYPERTENSION: ICD-10-CM

## 2024-10-31 DIAGNOSIS — B00.9 HSV (HERPES SIMPLEX VIRUS) INFECTION: ICD-10-CM

## 2024-10-31 DIAGNOSIS — L82.0 INFLAMED SEBORRHEIC KERATOSIS: ICD-10-CM

## 2024-10-31 DIAGNOSIS — I70.1 ATHEROSCLEROSIS OF RENAL ARTERY (H): ICD-10-CM

## 2024-10-31 DIAGNOSIS — M20.41 HAMMER TOE OF RIGHT FOOT: ICD-10-CM

## 2024-10-31 DIAGNOSIS — M67.40 MUCOID CYST, JOINT: ICD-10-CM

## 2024-10-31 DIAGNOSIS — M85.89 OSTEOPENIA OF MULTIPLE SITES: ICD-10-CM

## 2024-10-31 DIAGNOSIS — B02.29 OTHER POSTHERPETIC NERVOUS SYSTEM INVOLVEMENT: ICD-10-CM

## 2024-10-31 DIAGNOSIS — Z00.00 ENCOUNTER FOR MEDICARE ANNUAL WELLNESS EXAM: Primary | ICD-10-CM

## 2024-10-31 DIAGNOSIS — J47.9 BRONCHIECTASIS WITHOUT COMPLICATION (H): ICD-10-CM

## 2024-10-31 DIAGNOSIS — E78.00 PURE HYPERCHOLESTEROLEMIA: ICD-10-CM

## 2024-10-31 LAB
% GRANULOCYTES: 72.8 % (ref 42.2–75.2)
ALBUMIN SERPL BCG-MCNC: 4.5 G/DL (ref 3.5–5.2)
ALP SERPL-CCNC: 73 U/L (ref 40–150)
ALT SERPL W P-5'-P-CCNC: 24 U/L (ref 0–50)
ANION GAP SERPL CALCULATED.3IONS-SCNC: 11 MMOL/L (ref 7–15)
AST SERPL W P-5'-P-CCNC: 30 U/L (ref 0–45)
BILIRUB SERPL-MCNC: 0.7 MG/DL
BUN SERPL-MCNC: 13.7 MG/DL (ref 8–23)
CALCIUM SERPL-MCNC: 9.4 MG/DL (ref 8.8–10.4)
CHLORIDE SERPL-SCNC: 103 MMOL/L (ref 98–107)
CHOLESTEROL: 204 MG/DL (ref 100–199)
CREAT SERPL-MCNC: 0.86 MG/DL (ref 0.51–0.95)
EGFRCR SERPLBLD CKD-EPI 2021: 70 ML/MIN/1.73M2
FASTING STATUS PATIENT QL REPORTED: YES
FASTING?: YES
GLUCOSE SERPL-MCNC: 102 MG/DL (ref 70–99)
HCO3 SERPL-SCNC: 27 MMOL/L (ref 22–29)
HCT VFR BLD AUTO: 40.5 % (ref 35–46)
HDL (RMG): 74 MG/DL (ref 40–?)
HEMOGLOBIN: 13.4 G/DL (ref 11.8–15.5)
LDL CALCULATED (RMG): 115 MG/DL (ref 0–130)
LYMPHOCYTES NFR BLD AUTO: 19.5 % (ref 20.5–51.1)
MCH RBC QN AUTO: 29.3 PG (ref 27–31)
MCHC RBC AUTO-ENTMCNC: 33 G/DL (ref 33–37)
MCV RBC AUTO: 88.7 FL (ref 80–100)
MONOCYTES NFR BLD AUTO: 7.7 % (ref 1.7–9.3)
PLATELET # BLD AUTO: 212 K/UL (ref 140–450)
POTASSIUM SERPL-SCNC: 4 MMOL/L (ref 3.4–5.3)
PROT SERPL-MCNC: 6.6 G/DL (ref 6.4–8.3)
RBC # BLD AUTO: 4.57 X10/CMM (ref 3.7–5.2)
SODIUM SERPL-SCNC: 141 MMOL/L (ref 135–145)
TRIGLYCERIDES (RMG): 78 MG/DL (ref 0–149)
WBC # BLD AUTO: 6.4 X10/CMM (ref 3.8–11)

## 2024-10-31 PROCEDURE — G0439 PPPS, SUBSEQ VISIT: HCPCS | Performed by: FAMILY MEDICINE

## 2024-10-31 PROCEDURE — 80061 LIPID PANEL: CPT | Mod: QW | Performed by: FAMILY MEDICINE

## 2024-10-31 PROCEDURE — 80051 ELECTROLYTE PANEL: CPT | Performed by: FAMILY MEDICINE

## 2024-10-31 PROCEDURE — 99214 OFFICE O/P EST MOD 30 MIN: CPT | Mod: 25 | Performed by: FAMILY MEDICINE

## 2024-10-31 PROCEDURE — 80053 COMPREHEN METABOLIC PANEL: CPT | Performed by: FAMILY MEDICINE

## 2024-10-31 PROCEDURE — 85025 COMPLETE CBC W/AUTO DIFF WBC: CPT | Performed by: FAMILY MEDICINE

## 2024-10-31 PROCEDURE — 36415 COLL VENOUS BLD VENIPUNCTURE: CPT | Performed by: FAMILY MEDICINE

## 2024-10-31 RX ORDER — VALACYCLOVIR HYDROCHLORIDE 500 MG/1
500 TABLET, FILM COATED ORAL DAILY
Qty: 90 TABLET | Refills: 3 | Status: SHIPPED | OUTPATIENT
Start: 2024-10-31

## 2024-10-31 RX ORDER — LACTO NO.76/BIFIDO/FOS/LARCH 25B-25B-50
1 CAPSULE ORAL DAILY
COMMUNITY
Start: 2024-07-01

## 2024-10-31 RX ORDER — CYANOCOBALAMIN (VITAMIN B-12) 2500 MCG
2500 TABLET, SUBLINGUAL SUBLINGUAL DAILY
Qty: 30 TABLET | Refills: 11 | Status: SHIPPED | OUTPATIENT
Start: 2024-10-31

## 2024-10-31 RX ORDER — SIMVASTATIN 20 MG
20 TABLET ORAL AT BEDTIME
Qty: 90 TABLET | Refills: 3 | Status: SHIPPED | OUTPATIENT
Start: 2024-10-31

## 2024-10-31 RX ORDER — LACTOBACILLUS RHAMNOSUS GG 10B CELL
1 CAPSULE ORAL 2 TIMES DAILY
COMMUNITY

## 2024-10-31 RX ORDER — LISINOPRIL 5 MG/1
5 TABLET ORAL DAILY
Qty: 90 TABLET | Refills: 3 | Status: SHIPPED | OUTPATIENT
Start: 2024-10-31

## 2024-10-31 NOTE — PROGRESS NOTES
Preventive Care Visit  [facility]  Kb Lua MD, Family Medicine  Oct 31, 2024    Assessment & Plan     Encounter for Medicare annual wellness exam    - VENOUS COLLECTION    Pure hypercholesterolemia  Discussed current lipid results, previous results (if available) current guidelines (NCEP) for treatment and goals for lipids.  Discussed lifestyle modification, dietary changes (low fat, low simple carb) and regular aerobic exercise.  Discussed the link between dysmetabolic syndrome and impaired glucose tolerance seen in certain patterns of lipids.  Briefly discussed medication used for lipid lowering, including the statins are their possible side effects of myalgias, rhabdomyolysis, and liver toxicity.    - simvastatin (ZOCOR) 20 MG tablet  Dispense: 90 tablet; Refill: 3    HSV (herpes simplex virus) infection  Doing well on suppression  - valACYclovir (VALTREX) 500 MG tablet  Dispense: 90 tablet; Refill: 3    Other postherpetic nervous system involvement  Working   - vitamin B-12 (CYANOCOBALAMIN) 2500 MCG sublingual tablet  Dispense: 30 tablet; Refill: 11    Bronchiectasis without complication (H)  Actively managed by [facility] and no dyspnea    Atherosclerosis of renal artery (H)    We will continue to aggressively manage secondary risk factors, including aggressive BP control (under 130/ideally), aggressive LDL lowering with statin (goal under 100 for sure/under 70 ideally), no smoking, diabetes prevention/management, no smoking, and use of either ASA or similar anti platelet agent if tolerated.     - Lipid Profile ([facility])    Osteopenia of multiple sites  Gets bone density with Gyne    Essential hypertension  New and will start Lisinopril and recheck in a month  - CBC with Diff/Plt ([facility])  - Comprehensive metabolic panel  - lisinopril (ZESTRIL) 5 MG tablet  Dispense: 90 tablet; Refill: 3    Inflamed seborrheic keratosis  Cryod     Mucoid cyst, joint  Just fyi    Hammer toe of right foot  Just  fyi      Patient has been advised of split billing requirements and indicates understanding: Yes        Counseling  Appropriate preventive services were addressed with this patient via screening, questionnaire, or discussion as appropriate for fall prevention, nutrition, physical activity, Tobacco-use cessation, social engagement, weight loss and cognition.  Checklist reviewing preventive services available has been given to the patient.  Reviewed patient's diet, addressing concerns and/or questions.       Work on weight loss  Regular exercise    No follow-ups on file.    Mimi Salvador is a 74 year old, presenting for the following:  Hammer Toe (Right foot , 2nd toe ), Derm Problem (Cyst on great toe on right foot, operated around 2012), Physical (Fasting/), Hearing Screening, and Recheck Medication (She she continue taking Valacyclovir /)          Health Care Directive  Patient does not have a Health Care Directive: Discussed advance care planning with patient; information given to patient to review.  As is reasonable care for most folks, In the short term, she wants usual aggressive medical care.   No desire for long term prolongation of life through artificial means if no hope to bring back to a reasonable status.      HPI  Here for AWV  and to follow up on the above        10/24/2024   General Health   How would you rate your overall physical health? Good   Feel stress (tense, anxious, or unable to sleep) Not at all            10/24/2024   Nutrition   Diet: Regular (no restrictions)            10/24/2024   Exercise   Days per week of moderate/strenous exercise 5 days   Average minutes spent exercising at this level 20 min            10/24/2024   Social Factors   Frequency of gathering with friends or relatives More than three times a week   Worry food won't last until get money to buy more No   Food not last or not have enough money for food? No   Do you have housing? (Housing is defined as stable permanent  housing and does not include staying ouside in a car, in a tent, in an abandoned building, in an overnight shelter, or couch-surfing.) Yes   Are you worried about losing your housing? No   Lack of transportation? No   Unable to get utilities (heat,electricity)? No            10/24/2024   Fall Risk   Fallen 2 or more times in the past year? No    Trouble with walking or balance? No        Patient-reported          10/24/2024   Activities of Daily Living- Home Safety   Needs help with the following daily activites None of the above   Safety concerns in the home None of the above            10/24/2024   Dental   Dentist two times every year? Yes            10/24/2024   Hearing Screening   Hearing concerns? None of the above            10/24/2024   Driving Risk Screening   Patient/family members have concerns about driving No            10/24/2024   General Alertness/Fatigue Screening   Have you been more tired than usual lately? No            10/24/2024   Urinary Incontinence Screening   Bothered by leaking urine in past 6 months No            10/24/2024   TB Screening   Were you born outside of the [place]? No            Today's PHQ-2 Score:       10/31/2024     9:52 AM   PHQ-2 ( 1999 [employer])   Q1: Little interest or pleasure in doing things 0   Q2: Feeling down, depressed or hopeless 0   PHQ-2 Score 0           10/24/2024   Substance Use   Alcohol more than 3/day or more than 7/wk No   Do you have a current opioid prescription? No   How severe/bad is pain from 1 to 10? 0/10 (No Pain)   Do you use any other substances recreationally? No        Social History     Tobacco Use    Smoking status: Never    Smokeless tobacco: Never   Substance Use Topics    Alcohol use: Yes     Alcohol/week: 1.0 standard drink of alcohol     Types: 1 Standard drinks or equivalent per week    Drug use: No           10/23/2023   LAST FHS-7 RESULTS   1st degree relative breast or ovarian cancer Yes   Any relative bilateral breast cancer No   Any  male have breast cancer No   Any ONE woman have BOTH breast AND ovarian cancer No   Any woman with breast cancer before 50yrs No   2 or more relatives with breast AND/OR ovarian cancer Yes   2 or more relatives with breast AND/OR bowel cancer No           Mammogram Screening - After age 74- determine frequency with patient based on health status, life expectancy and patient goals    ASCVD Risk   The 10-year ASCVD risk score (Tonya ASHLEY, et al., 2019) is: 27.4%    Values used to calculate the score:      Age: 74 years      Sex: Female      Is Non- : No      Diabetic: No      Tobacco smoker: No      Systolic Blood Pressure: 164 mmHg      Is BP treated: No      HDL Cholesterol: 66 mg/dL      Total Cholesterol: 182 mg/dL            Reviewed and updated as needed this visit by Provider                    Lab work is in process  Current providers sharing in care for this patient include:  Patient Care Team:  Kb Lua MD as PCP - General (Family Practice)  Kb Lua MD as Assigned PCP    The following health maintenance items are reviewed in Epic and correct as of today:  Health Maintenance   Topic Date Due    RSV VACCINE (1 - 1-dose 75+ series) Never done    INFLUENZA VACCINE (1) 09/01/2024    BMP  10/25/2024    LIPID  10/25/2024    COLORECTAL CANCER SCREENING  12/29/2024    MEDICARE ANNUAL WELLNESS VISIT  05/28/2025    FALL RISK ASSESSMENT  10/31/2025    GLUCOSE  10/25/2026    ADVANCE CARE PLANNING  10/25/2028    DTAP/TDAP/TD IMMUNIZATION (3 - Td or Tdap) 04/21/2033    DEXA  07/24/2034    HEPATITIS C SCREENING  Completed    PHQ-2 (once per calendar year)  Completed    Pneumococcal Vaccine: 65+ Years  Completed    ZOSTER IMMUNIZATION  Completed    COVID-19 Vaccine  Completed    HPV IMMUNIZATION  Aged Out    MENINGITIS IMMUNIZATION  Aged Out    RSV MONOCLONAL ANTIBODY  Aged Out    MAMMO SCREENING  Discontinued         Review of Systems  Constitutional, HEENT,  "cardiovascular, pulmonary, GI, , musculoskeletal, neuro, skin, endocrine and psych systems are negative, except as otherwise noted.     Objective    Exam  BP (!) 164/78   Pulse 65   Wt 60.1 kg (132 lb 6.4 oz)   SpO2 97%   BMI 21.70 kg/m     Estimated body mass index is 21.7 kg/m  as calculated from the following:    Height as of 4/24/24: 1.664 m (5' 5.5\").    Weight as of this encounter: 60.1 kg (132 lb 6.4 oz).    Physical Exam  GENERAL: alert and no distress  EYES: Eyes grossly normal to inspection, PERRL and conjunctivae and sclerae normal  HENT: ear canals and TM's normal, nose and mouth without ulcers or lesions  NECK: no adenopathy, no asymmetry, masses, or scars  RESP: lungs clear to auscultation - no rales, rhonchi or wheezes  CV: regular rate and rhythm, normal S1 S2, no S3 or S4, no murmur, click or rub, no peripheral edema  ABDOMEN: soft, nontender, no hepatosplenomegaly, no masses and bowel sounds normal  MS: no gross musculoskeletal defects noted, no edema  SKIN: no suspicious lesions or rashes  NEURO: Normal strength and tone, mentation intact and speech normal  PSYCH: mentation appears normal, affect normal/bright  LYMPH: no cervical, supraclavicular, axillary, or inguinal adenopathy        10/31/2024   Mini Cog   Clock Draw Score 2 Normal   3 Item Recall 3 objects recalled   Mini Cog Total Score 5               Signed Electronically by: Kb Lua MD    "

## 2024-11-04 ENCOUNTER — HOSPITAL ENCOUNTER (OUTPATIENT)
Dept: MAMMOGRAPHY | Facility: CLINIC | Age: 74
Discharge: HOME | End: 2024-11-04
Attending: FAMILY MEDICINE | Admitting: FAMILY MEDICINE
Payer: COMMERCIAL

## 2024-11-04 DIAGNOSIS — Z12.31 VISIT FOR SCREENING MAMMOGRAM: ICD-10-CM

## 2024-11-04 PROCEDURE — 77063 BREAST TOMOSYNTHESIS BI: CPT

## 2024-11-21 DIAGNOSIS — B00.9 HSV (HERPES SIMPLEX VIRUS) INFECTION: ICD-10-CM

## 2024-11-21 RX ORDER — VALACYCLOVIR HYDROCHLORIDE 500 MG/1
500 TABLET, FILM COATED ORAL DAILY
Qty: 90 TABLET | Refills: 3 | Status: SHIPPED | OUTPATIENT
Start: 2024-11-21

## 2024-11-25 NOTE — PROGRESS NOTES
Answers submitted by the patient for this visit:  Hypertension Visit (Submitted on 11/22/2024)  Chief Complaint: Chronic problems general questions HPI Form  Do you check your blood pressure regularly outside of the clinic?: Yes  Are your blood pressures ever more than 140 on the top number (systolic) OR more than 90 on the bottom number (diastolic)? (For example, greater than 140/90): No  Are you following a low salt diet?: Yes  General Questionnaire (Submitted on 11/22/2024)  Chief Complaint: Chronic problems general questions HPI Form  How many servings of fruits and vegetables do you eat daily?: 4 or more  On average, how many sweetened beverages do you drink each day (Examples: soda, juice, sweet tea, etc.  Do NOT count diet or artificially sweetened beverages)?: 2  How many minutes a day do you exercise enough to make your heart beat faster?: 10 to 19  How many days a week do you exercise enough to make your heart beat faster?: 5  How many days per week do you miss taking your medication?: 0  Questionnaire about: Chronic problems general questions HPI Form (Submitted on 11/22/2024)  Chief Complaint: Chronic problems general questions HPI Form  ROS:  General and Resp. completed and negative except as noted above    Histories: reviewed    OBJECTIVE:                                                    /81   Pulse 78   Wt 59 kg (130 lb)   SpO2 97%   BMI 21.30 kg/m    Body mass index is 21.3 kg/m .   APPEARANCE: = Relaxed and in no distress  Conj/Eyelids = noninjected and lids and lashes are without inflammation  PERRLA/Irises = Pupils Round Reactive to Light and Irisis without inflammation  Neck = No anterior or posterior adenopathy appreciated.  Thyroid = Not enlarged and no masses felt  Resp effort = Calm regular breathing  Breath Sounds = Good air movement with no rales or rhonchi in any lung fields  Heart Rate, Rhythm, & sounds (no Murm)  = Regular rate and rhythm with no S3, S4, or murmur  appreciated.  Carotid Art's = Pulses full and equal and no bruits appreciated  Abdomen = Soft, nontender, no masses, & bowel sounds in all quadrants  Liver/Spleen = Normal span and no splenomegaly noted  Digits and Nails = FROM in all finger joints, no nail dystrophy  Ext (edema) = No pretibial edema noted or elsewhere  Musculsktl =  Strength and ROM of major joints are within normal limits  SKIN = absent significant rashes or lesions   Recent/Remote Memory = Alert and Oriented x 3  Mood/Affect = Cooperative and interested     ASSESSMENT/PLAN:                                                    Quality gaps reviewed    Madeleine was seen today for recheck.    Diagnoses and all orders for this visit:    Essential hypertension  -     Basic metabolic panel; Future    Numbers reviewed that she brings in and checked her machine  and it correlates.    Regular exercise    Health maintenance items are reviewed in Epic and correct as of today:    Kb Lua MD  [facility]  [facility]  [phone]    For any issues my office # is [phone]      The longitudinal plan of care for the diagnosis(es)/condition(s) as documented were addressed during this visit. Due to the added complexity in care, I will continue to support Madeleine in the subsequent management and with ongoing continuity of care.

## 2024-11-27 ENCOUNTER — OFFICE VISIT (OUTPATIENT)
Dept: FAMILY MEDICINE | Facility: CLINIC | Age: 74
End: 2024-11-27

## 2024-11-27 VITALS
OXYGEN SATURATION: 97 % | SYSTOLIC BLOOD PRESSURE: 136 MMHG | WEIGHT: 130 LBS | HEART RATE: 78 BPM | DIASTOLIC BLOOD PRESSURE: 81 MMHG | BODY MASS INDEX: 21.3 KG/M2

## 2024-11-27 DIAGNOSIS — I10 ESSENTIAL HYPERTENSION: Primary | ICD-10-CM

## 2024-11-27 LAB
ANION GAP SERPL CALCULATED.3IONS-SCNC: 8 MMOL/L (ref 7–15)
BUN SERPL-MCNC: 13.5 MG/DL (ref 8–23)
CALCIUM SERPL-MCNC: 9.3 MG/DL (ref 8.8–10.4)
CHLORIDE SERPL-SCNC: 96 MMOL/L (ref 98–107)
CREAT SERPL-MCNC: 0.83 MG/DL (ref 0.51–0.95)
EGFRCR SERPLBLD CKD-EPI 2021: 73 ML/MIN/1.73M2
FASTING STATUS PATIENT QL REPORTED: NO
GLUCOSE SERPL-MCNC: 82 MG/DL (ref 70–99)
HCO3 SERPL-SCNC: 28 MMOL/L (ref 22–29)
POTASSIUM SERPL-SCNC: 4.5 MMOL/L (ref 3.4–5.3)
SODIUM SERPL-SCNC: 132 MMOL/L (ref 135–145)

## 2024-11-27 PROCEDURE — 82435 ASSAY OF BLOOD CHLORIDE: CPT | Performed by: FAMILY MEDICINE

## 2024-11-27 PROCEDURE — 82565 ASSAY OF CREATININE: CPT | Performed by: FAMILY MEDICINE

## 2024-11-27 PROCEDURE — 80048 BASIC METABOLIC PNL TOTAL CA: CPT | Performed by: FAMILY MEDICINE

## 2025-05-01 ENCOUNTER — OFFICE VISIT (OUTPATIENT)
Dept: FAMILY MEDICINE | Facility: CLINIC | Age: 75
End: 2025-05-01

## 2025-05-01 VITALS
HEART RATE: 63 BPM | DIASTOLIC BLOOD PRESSURE: 75 MMHG | SYSTOLIC BLOOD PRESSURE: 134 MMHG | HEIGHT: 66 IN | BODY MASS INDEX: 21.08 KG/M2 | WEIGHT: 131.2 LBS | OXYGEN SATURATION: 97 %

## 2025-05-01 DIAGNOSIS — M79.662 PAIN OF LEFT LOWER LEG: Primary | ICD-10-CM

## 2025-05-01 PROCEDURE — 3075F SYST BP GE 130 - 139MM HG: CPT | Performed by: STUDENT IN AN ORGANIZED HEALTH CARE EDUCATION/TRAINING PROGRAM

## 2025-05-01 PROCEDURE — 3078F DIAST BP <80 MM HG: CPT | Performed by: STUDENT IN AN ORGANIZED HEALTH CARE EDUCATION/TRAINING PROGRAM

## 2025-05-01 RX ORDER — CHLORAL HYDRATE 500 MG
2 CAPSULE ORAL 2 TIMES DAILY
COMMUNITY

## 2025-05-01 NOTE — PROGRESS NOTES
Assessment & Plan   Assessment & Plan  Pain of left lower leg  -patient has impressive bruising  -tenderness to palpation at base of the 5th metatarsal and inferior posterior portion of lateral malleolus   -XR foot left: concerning 5th metatarsal, discussed possibility of air boot with patient, will await radiology read   -XR ankle left: no concerning features on my read  -given bruising going up the left calf, recommend US to evaluate for small achilles tear  -discussed that if things worsen rather than improve, would need to get orthopedics involved  Orders:    XR Foot Left G/E 3 Views    XR Ankle Left G/E 3 Views    US MSK Complete; Future       Follow-up   Return if symptoms worsen or fail to improve.    Subjective   Madeleine is a 75 year old, presenting for the following health issues:  Ankle/Foot left (Injured on Saturday, rotation injury; patients center of gravity shifted but ankle did not move. //Patient has significant bruising and swelling on both sides of her heel, as well as the top of her foot. She notes pain is greatest in her lateral foot. Bruising and swelling has not significantly improved.//Advil has been helping with the pain.)    History of Present Illness       Reason for visit:  Sprained ankle  Symptom onset:  3-7 days ago  Symptoms include:  Slight pain swelling discoloration continuing pain up the leg  Symptom intensity:  Moderate  Symptom progression:  Staying the same  Had these symptoms before:  No  What makes it better:  Ice elevation NSAIDS REST She is missing 1 dose(s) of medications per week.  She is not taking prescribed medications regularly due to remembering to take and other.     Patient was out to lunch on Saturday, got up and twisted her left ankle. Since then, patient has been icing and elevating the ankle along with taking as needed Advil. Patient is concerned that bruising is worsening rather than improving. The pain in her foot woke her up the other night. When walking, she  "favors her right foot due to the discomfort.    Review of Systems   Musculoskeletal:  Positive for joint pain.          Objective    /75   Pulse 63   Ht 1.67 m (5' 5.75\")   Wt 59.5 kg (131 lb 3.2 oz)   SpO2 97%   BMI 21.34 kg/m    Body mass index is 21.34 kg/m .  Physical Exam  Constitutional:       General: She is not in acute distress.     Appearance: Normal appearance.   HENT:      Head: Normocephalic and atraumatic.   Eyes:      Conjunctiva/sclera: Conjunctivae normal.   Cardiovascular:      Rate and Rhythm: Normal rate and regular rhythm.      Pulses: Normal pulses.      Heart sounds: Normal heart sounds.   Pulmonary:      Effort: Pulmonary effort is normal. No respiratory distress.      Breath sounds: Normal breath sounds.   Musculoskeletal:      Left ankle:      Left Achilles Tendon: Tenderness present. No defects. Forte's test negative.        Feet:       Comments: See photos below   Feet:      Comments: Tenderness to palpation at base of the 5th metatarsal and inferior posterior portion of lateral malleoulus  Skin:     General: Skin is warm and dry.   Neurological:      Mental Status: She is alert.   Psychiatric:         Thought Content: Thought content normal.                              Signed Electronically by: Oksana Wolf DO    "

## 2025-05-03 ENCOUNTER — TELEPHONE (OUTPATIENT)
Dept: FAMILY MEDICINE | Facility: CLINIC | Age: 75
End: 2025-05-03

## 2025-05-03 DIAGNOSIS — N30.00 ACUTE CYSTITIS WITHOUT HEMATURIA: Primary | ICD-10-CM

## 2025-05-03 PROCEDURE — 98014 SYNCH AUDIO-ONLY EST MOD 30: CPT

## 2025-05-03 RX ORDER — NITROFURANTOIN 25; 75 MG/1; MG/1
100 CAPSULE ORAL 2 TIMES DAILY
Qty: 10 CAPSULE | Refills: 0 | Status: SHIPPED | OUTPATIENT
Start: 2025-05-03 | End: 2025-05-08

## 2025-05-03 RX ORDER — PHENAZOPYRIDINE HYDROCHLORIDE 100 MG/1
100 TABLET, FILM COATED ORAL 3 TIMES DAILY PRN
Qty: 15 TABLET | Refills: 0 | Status: SHIPPED | OUTPATIENT
Start: 2025-05-03

## 2025-05-04 NOTE — TELEPHONE ENCOUNTER
SUBJECTIVE:      Madeleine Morales, is a 75 year old female presenting for lower urinary symptoms.     -Dysuria present .  -Urgency present .  -Frequency absent.  -Fevers and chills absent.  -Jimbo hematuria absent.  -Back or flank pain absent.    Sx onset today.  Pushing fluids.    History of ESBL infection in 2023.  Reviewed allergies.      OBJECTIVE:  There were no vitals filed for this visit. There is no height or weight on file to calculate BMI.  Speaking in complete sentences.     UA RESULTS:  Color Urine (no units)   Date Value   08/11/2023 Yellow     Specific Gravity Urine (no units)   Date Value   08/11/2023 1.010     pH Urine (pH)   Date Value   08/11/2023 7.0     Urobilinogen Urine (EU/dL)   Date Value   09/30/2020 0.2     Nitrite Urine (no units)   Date Value   09/30/2020 Neg       ASSESSMENT / PLAN:      Diagnoses and all orders for this visit:    Acute cystitis without hematuria  -     phenazopyridine (PYRIDIUM) 100 MG tablet; Take 1 tablet (100 mg) by mouth 3 times daily as needed for urinary tract discomfort. Do not take longer than 3 days.  -     nitroFURantoin macrocrystal-monohydrate (MACROBID) 100 MG capsule; Take 1 capsule (100 mg) by mouth 2 times daily for 5 days.    Sounds like uncomplicated UTI.  If not improving come Monday, needs to schedule visit for further assessment and urine sample.      Discussed urinary tract infections, including risk factors (including sexual activity, bladder outlet obstruction issues), prevention strategies, and basics of UTI management. Drink fluids to produce regular urination which can help prevent future UTIs. If the symptoms are not cleared with the prescribed treatment, contact us for further evaluation.

## 2025-05-06 ENCOUNTER — TRANSFERRED RECORDS (OUTPATIENT)
Dept: FAMILY MEDICINE | Facility: CLINIC | Age: 75
End: 2025-05-06

## 2025-05-19 ENCOUNTER — TRANSFERRED RECORDS (OUTPATIENT)
Dept: FAMILY MEDICINE | Facility: CLINIC | Age: 75
End: 2025-05-19

## 2025-05-28 ENCOUNTER — OFFICE VISIT (OUTPATIENT)
Dept: FAMILY MEDICINE | Facility: CLINIC | Age: 75
End: 2025-05-28

## 2025-05-28 VITALS — SYSTOLIC BLOOD PRESSURE: 128 MMHG | DIASTOLIC BLOOD PRESSURE: 63 MMHG | HEART RATE: 66 BPM | OXYGEN SATURATION: 98 %

## 2025-05-28 DIAGNOSIS — I10 ESSENTIAL HYPERTENSION: ICD-10-CM

## 2025-05-28 DIAGNOSIS — Z12.11 SCREENING FOR COLON CANCER: Primary | ICD-10-CM

## 2025-05-28 DIAGNOSIS — J47.9 BRONCHIECTASIS WITHOUT COMPLICATION (H): ICD-10-CM

## 2025-05-28 LAB
ANION GAP SERPL CALCULATED.3IONS-SCNC: 7 MMOL/L (ref 7–15)
BUN SERPL-MCNC: 19.1 MG/DL (ref 8–23)
CALCIUM SERPL-MCNC: 10.1 MG/DL (ref 8.8–10.4)
CHLORIDE SERPL-SCNC: 103 MMOL/L (ref 98–107)
CREAT SERPL-MCNC: 0.87 MG/DL (ref 0.51–0.95)
EGFRCR SERPLBLD CKD-EPI 2021: 68 ML/MIN/1.73M2
FASTING STATUS PATIENT QL REPORTED: YES
GLUCOSE SERPL-MCNC: 97 MG/DL (ref 70–99)
HCO3 SERPL-SCNC: 30 MMOL/L (ref 22–29)
POTASSIUM SERPL-SCNC: 4.5 MMOL/L (ref 3.4–5.3)
SODIUM SERPL-SCNC: 140 MMOL/L (ref 135–145)

## 2025-05-28 PROCEDURE — 36415 COLL VENOUS BLD VENIPUNCTURE: CPT | Performed by: FAMILY MEDICINE

## 2025-05-28 PROCEDURE — G2211 COMPLEX E/M VISIT ADD ON: HCPCS | Performed by: FAMILY MEDICINE

## 2025-05-28 PROCEDURE — 80048 BASIC METABOLIC PNL TOTAL CA: CPT | Performed by: FAMILY MEDICINE

## 2025-05-28 PROCEDURE — 99214 OFFICE O/P EST MOD 30 MIN: CPT | Performed by: FAMILY MEDICINE

## 2025-05-28 PROCEDURE — 3074F SYST BP LT 130 MM HG: CPT | Performed by: FAMILY MEDICINE

## 2025-05-28 PROCEDURE — 3078F DIAST BP <80 MM HG: CPT | Performed by: FAMILY MEDICINE

## 2025-05-28 PROCEDURE — 80048 BASIC METABOLIC PNL TOTAL CA: CPT | Mod: 90 | Performed by: FAMILY MEDICINE

## 2025-05-28 NOTE — PROGRESS NOTES
Essential Hypertension   Remains well controlled when checked out of clinic.   she has not experienced any significant side effects from medications for hypertension.    NO active cardiac complaints or symptoms with exercise.  Current medications for treatment:  ACE inhibitors (Lisinopril, Ramipril,Captopril,Benazepril)    Reviewed last 6 BP readings in chart:  BP Readings from Last 6 Encounters:   05/28/25 128/63   05/01/25 134/75   11/27/24 136/81   10/31/24 (!) 164/78   04/24/24 133/80   10/25/23 (!) 141/76     ROS:  General and Resp. completed and negative except as noted above    Histories: reviewed    OBJECTIVE:                                                    /63   Pulse 66   SpO2 98%   There is no height or weight on file to calculate BMI.   APPEARANCE: = Relaxed and in no distress  Ears/Nose = External structures and Nares have usual shape and form  Ear canals and TM's = Canals are not inflammed and have none or little wax and the drums are not injected and have a light reflex   Oropharynx = No leukoplakia, No injection to the tissues, Normal Uvula  Resp effort = Calm regular breathing  Breath Sounds = Good air movement with no rales or rhonchi in any lung fields  Heart Rate, Rhythm, & sounds (no Murm)  = Regular rate and rhythm with no S3, S4, or murmur appreciated.     ASSESSMENT/PLAN:                                                    Quality gaps reviewed    Madeleine was seen today for 6 month follow up.    Diagnoses and all orders for this visit:    Screening for colon cancer  -     COLOGUARD([employer]); Future    Bronchiectasis without complication (H)  No dyspnea, seen on imaging and uncomplicated.    Essential hypertension  Reviewed her current HTN management. Discussed our goal for her is a systolic pressure at or below 128 and diastolic pressure at or below 83.  We today managed her prescriptions with refills ensured to ensure availabilty of current medications.  Discussed the  importance for aggressive management of HTN to prevent vascular complications later.  Recommended lower fat, lower carbohydrate, and lower sodium (<2000 mg)diet. Required intervals for follow up on HTN, lab studies reviewed.    Strongly recommened she follow her blood pressures outside the clinic to ensure that BPs are remaining within guidelines,.  Instructed to contact me if the readings are not within guidelines on a regular basis so we can adjust treatment as needed.    -     Basic metabolic panel; Future        Regular exercise    Health maintenance items are reviewed in Epic and correct as of today:    Kb Lua MD  [facility]  Family Practice  [facility]  [phone]    For any issues my office # is [phone]        Answers submitted by the patient for this visit:  Hypertension Visit (Submitted on 5/28/2025)  Chief Complaint: Chronic problems general questions HPI Form  Do you check your blood pressure regularly outside of the clinic?: Yes  Are your blood pressures ever more than 140 on the top number (systolic) OR more than 90 on the bottom number (diastolic)? (For example, greater than 140/90): No  Are you following a low salt diet?: Yes  General Questionnaire (Submitted on 5/28/2025)  Chief Complaint: Chronic problems general questions HPI Form  How many servings of fruits and vegetables do you eat daily?: 4 or more  On average, how many sweetened beverages do you drink each day (Examples: soda, juice, sweet tea, etc.  Do NOT count diet or artificially sweetened beverages)?: 1  How many minutes a day do you exercise enough to make your heart beat faster?: 10 to 19  How many days a week do you exercise enough to make your heart beat faster?: 3 or less  How many days per week do you miss taking your medication?: 0  Questionnaire about: Chronic problems general questions HPI Form (Submitted on 5/28/2025)  Chief Complaint: Chronic problems general questions HPI Form

## 2025-05-29 ENCOUNTER — RESULTS FOLLOW-UP (OUTPATIENT)
Dept: FAMILY MEDICINE | Facility: CLINIC | Age: 75
End: 2025-05-29

## 2025-06-09 ENCOUNTER — TRANSFERRED RECORDS (OUTPATIENT)
Dept: FAMILY MEDICINE | Facility: CLINIC | Age: 75
End: 2025-06-09

## 2025-06-24 ENCOUNTER — TRANSFERRED RECORDS (OUTPATIENT)
Dept: FAMILY MEDICINE | Facility: CLINIC | Age: 75
End: 2025-06-24

## 2025-08-12 ENCOUNTER — TELEPHONE (OUTPATIENT)
Dept: FAMILY MEDICINE | Facility: CLINIC | Age: 75
End: 2025-08-12

## 2025-08-12 DIAGNOSIS — M85.89 OSTEOPENIA OF MULTIPLE SITES: Primary | ICD-10-CM

## 2025-08-12 DIAGNOSIS — S92.353A FRACTURE OF BASE OF FIFTH METATARSAL BONE: ICD-10-CM

## 2025-08-18 ENCOUNTER — TRANSFERRED RECORDS (OUTPATIENT)
Dept: FAMILY MEDICINE | Facility: CLINIC | Age: 75
End: 2025-08-18